# Patient Record
Sex: FEMALE | Race: WHITE | Employment: OTHER | URBAN - METROPOLITAN AREA
[De-identification: names, ages, dates, MRNs, and addresses within clinical notes are randomized per-mention and may not be internally consistent; named-entity substitution may affect disease eponyms.]

---

## 2021-05-27 ENCOUNTER — OFFICE VISIT (OUTPATIENT)
Dept: FAMILY MEDICINE CLINIC | Age: 81
End: 2021-05-27
Payer: COMMERCIAL

## 2021-05-27 VITALS
BODY MASS INDEX: 34.73 KG/M2 | TEMPERATURE: 98.1 F | WEIGHT: 161 LBS | HEIGHT: 57 IN | DIASTOLIC BLOOD PRESSURE: 76 MMHG | SYSTOLIC BLOOD PRESSURE: 110 MMHG | OXYGEN SATURATION: 96 % | HEART RATE: 61 BPM

## 2021-05-27 DIAGNOSIS — Z76.89 ENCOUNTER TO ESTABLISH CARE: Primary | ICD-10-CM

## 2021-05-27 DIAGNOSIS — F41.9 ANXIETY: ICD-10-CM

## 2021-05-27 DIAGNOSIS — E78.5 HYPERLIPIDEMIA, UNSPECIFIED HYPERLIPIDEMIA TYPE: ICD-10-CM

## 2021-05-27 DIAGNOSIS — M81.0 SENILE OSTEOPOROSIS: ICD-10-CM

## 2021-05-27 DIAGNOSIS — F32.A DEPRESSION, UNSPECIFIED DEPRESSION TYPE: ICD-10-CM

## 2021-05-27 DIAGNOSIS — I10 ESSENTIAL HYPERTENSION: ICD-10-CM

## 2021-05-27 DIAGNOSIS — Z00.00 PREVENTATIVE HEALTH CARE: ICD-10-CM

## 2021-05-27 PROBLEM — L89.302 PRESSURE INJURY OF BUTTOCK, STAGE 2 (HCC): Status: ACTIVE | Noted: 2021-05-25

## 2021-05-27 PROCEDURE — 99387 INIT PM E/M NEW PAT 65+ YRS: CPT | Performed by: NURSE PRACTITIONER

## 2021-05-27 RX ORDER — SIMVASTATIN 10 MG
10 TABLET ORAL NIGHTLY
COMMUNITY
End: 2021-06-29 | Stop reason: SDUPTHER

## 2021-05-27 RX ORDER — BUMETANIDE 0.5 MG/1
0.5 TABLET ORAL DAILY
COMMUNITY
End: 2021-06-29 | Stop reason: SDUPTHER

## 2021-05-27 RX ORDER — METOPROLOL SUCCINATE 100 MG/1
100 TABLET, EXTENDED RELEASE ORAL DAILY
COMMUNITY
Start: 2021-03-25 | End: 2021-06-29 | Stop reason: SDUPTHER

## 2021-05-27 RX ORDER — SERTRALINE HYDROCHLORIDE 100 MG/1
50 TABLET, FILM COATED ORAL DAILY
COMMUNITY
Start: 2021-03-25 | End: 2021-06-29 | Stop reason: SDUPTHER

## 2021-05-27 SDOH — ECONOMIC STABILITY: FOOD INSECURITY: WITHIN THE PAST 12 MONTHS, THE FOOD YOU BOUGHT JUST DIDN'T LAST AND YOU DIDN'T HAVE MONEY TO GET MORE.: NEVER TRUE

## 2021-05-27 SDOH — ECONOMIC STABILITY: FOOD INSECURITY: WITHIN THE PAST 12 MONTHS, YOU WORRIED THAT YOUR FOOD WOULD RUN OUT BEFORE YOU GOT MONEY TO BUY MORE.: NEVER TRUE

## 2021-05-27 ASSESSMENT — ENCOUNTER SYMPTOMS
SORE THROAT: 0
BACK PAIN: 0
CHEST TIGHTNESS: 0
CONSTIPATION: 0
DIARRHEA: 0
COUGH: 0
RHINORRHEA: 0
ABDOMINAL PAIN: 0
ABDOMINAL DISTENTION: 0
VOMITING: 0
SHORTNESS OF BREATH: 0
NAUSEA: 0

## 2021-05-27 ASSESSMENT — PATIENT HEALTH QUESTIONNAIRE - PHQ9
SUM OF ALL RESPONSES TO PHQ QUESTIONS 1-9: 0
SUM OF ALL RESPONSES TO PHQ QUESTIONS 1-9: 0

## 2021-05-27 ASSESSMENT — SOCIAL DETERMINANTS OF HEALTH (SDOH): HOW HARD IS IT FOR YOU TO PAY FOR THE VERY BASICS LIKE FOOD, HOUSING, MEDICAL CARE, AND HEATING?: NOT HARD AT ALL

## 2021-05-27 NOTE — PROGRESS NOTES
Nabor Menjivar, APRN-CNP  [address] [facility]  [id number] , [address]  [address] [address]  Dept: [phone]  Dept Fax: [phone]    Patient ID: Dami Lozada is a 80 y.o. female. HPI:  Dami Lozada is a 80 y.o. female who presents to the office today for a first visit and to establish a relationship with a new primary care physician. Today, the patient has no complains. 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 She does live alone as her  has passed away. Michael Azevedo is her legal guardian and his law firm has appointed a home care worker to care for her and speak for her as far as medical care. She is a previous patient of Dr Ulices Enrique. She is currently following with Dr Daya Vance for a stage 2 pressure injury on her medial coccyx. The patient is forgetful and care taker is unsure of previous dates of many screenings. The patient has a history of breast ca and radiation in 2005. Mastectomy was not performed at this time and previous mammogram date unknown. The patient and staff are unsure of last PAP, colonoscopy, or DEXA scan. The patient denies ever being a smoker, only drink for communion, and denies any current or previous drug use. Pt denies any fever or chills. Pt today denies any HA, chest pain, or SOB. Pt denies any N/V/D/C or abdominal pain. Previous office notes, labs and diagnostic studies were reviewed prior to and during encounter. The patient's past medical, surgical, social, and family history as well as her current medications and allergies were reviewed as documented in today's encounter by LUCI De Leon.       Current Outpatient Medications on File Prior to Visit   Medication Sig Dispense Refill    metoprolol succinate (TOPROL XL) 100 MG extended release tablet Take 100 mg by mouth daily      sertraline (ZOLOFT) 100 MG tablet Take 50 mg by mouth daily       POTASSIUM CHLORIDE PO Take 50 mg by mouth Take 1 tablet daily      simvastatin (ZOCOR) 10 MG tablet Take 10 mg by mouth nightly      bumetanide (BUMEX) 0.5 MG tablet Take 0.5 mg by mouth daily      VITAMIN D PO Take 600 mg by mouth 1 tab daily       No current facility-administered medications on file prior to visit. SUBJECTIVE:      Review of Systems   Constitutional: Negative for activity change, fatigue and fever. HENT: Negative for congestion, ear pain, rhinorrhea and sore throat. Eyes: Positive for visual disturbance (wears glasses). Respiratory: Negative for cough, chest tightness and shortness of breath. Cardiovascular: Negative for chest pain and palpitations. Gastrointestinal: Negative for abdominal distention, abdominal pain, constipation, diarrhea, nausea and vomiting. Endocrine: Negative for polydipsia, polyphagia and polyuria. Genitourinary: Negative for difficulty urinating and dysuria. Musculoskeletal: Negative for arthralgias, back pain and myalgias. Skin: Negative for rash. Pressure ulcer to coccyx   Neurological: Negative for dizziness, weakness, light-headedness and headaches. Hematological: Negative for adenopathy. Psychiatric/Behavioral: Positive for confusion (stable) and dysphoric mood (Stable). Negative for agitation and behavioral problems. The patient is nervous/anxious (Stable). OBJECTIVE: /76   Pulse 61   Temp 98.1 °F (36.7 °C)   Ht 4' 9\" (1.448 m)   Wt 161 lb (73 kg)   SpO2 96%   Breastfeeding No   BMI 34.84 kg/m²     Physical Exam  Vitals reviewed. Constitutional:       General: She is not in acute distress. Appearance: Normal appearance. She is well-developed. HENT:      Head: Normocephalic and atraumatic. Right Ear: Hearing and external ear normal.      Left Ear: Hearing and external ear normal.      Nose: Nose normal. No congestion. Right Sinus: No maxillary sinus tenderness or frontal sinus tenderness.       Left Sinus: No maxillary sinus tenderness or frontal sinus tenderness. Mouth/Throat:      Lips: Pink. No lesions. Mouth: Mucous membranes are moist. No oral lesions. Tongue: No lesions. Pharynx: Oropharynx is clear. No oropharyngeal exudate or posterior oropharyngeal erythema. Eyes:      Extraocular Movements: Extraocular movements intact. Conjunctiva/sclera: Conjunctivae normal.      Pupils: Pupils are equal, round, and reactive to light. Comments: Glasses   Neck:      Thyroid: No thyroid mass. Cardiovascular:      Rate and Rhythm: Normal rate and regular rhythm. Pulses: Normal pulses. Heart sounds: Normal heart sounds. No murmur heard. Pulmonary:      Effort: Pulmonary effort is normal. No respiratory distress. Breath sounds: Normal breath sounds and air entry. No wheezing, rhonchi or rales. Abdominal:      General: Bowel sounds are normal. There is no distension. Palpations: Abdomen is soft. Tenderness: There is no abdominal tenderness. Musculoskeletal:         General: Normal range of motion. Cervical back: Full passive range of motion without pain and normal range of motion. Right lower leg: No edema. Left lower leg: No edema. Lymphadenopathy:      Cervical: No cervical adenopathy. Skin:     General: Skin is warm and dry. Capillary Refill: Capillary refill takes less than 2 seconds. Findings: No rash. Neurological:      General: No focal deficit present. Mental Status: She is alert and oriented to person, place, and time. Deep Tendon Reflexes: Reflexes normal.   Psychiatric:         Attention and Perception: Attention and perception normal.         Mood and Affect: Mood and affect normal.         Speech: Speech normal.         Behavior: Behavior normal. Behavior is cooperative. Cognition and Memory: Cognition is impaired. Memory is impaired. ASSESSMENT:   Diagnosis Orders   1.  Encounter to establish care     2. Preventative health care  CBC    Comprehensive Metabolic Panel    Hemoglobin A1C    Lipid Panel    TSH with Reflex   3. Essential hypertension     4. Hyperlipidemia, unspecified hyperlipidemia type     5. Anxiety     6. Depression, unspecified depression type     7. Senile osteoporosis  DEXA BONE DENSITY 2 SITES      PLAN:  1. Preventative health care  - We did discuss in detail the and the recommended preventative screening guidelines (HTN, lipid, DM, breast, cervical cancer, colorectal and osteoporosis). - Detailed education was provided on the patient's after visit summary.  - Will order above noted labs and discuss them at follow up visit. - Annual mammograms as recommended. - CBC; Future  - Comprehensive Metabolic Panel; Future  - Hemoglobin A1C; Future  - Lipid Panel; Future  - TSH with Reflex; Future    2. Essential hypertension  - Stable: Medication re-filled as needed, con't medications as prescribed, con't current tx plan  - Continue Toprolol as previously prescribed. - Continue Bumex as previously prescribed. - Low sodium diet and routine exercise encouraged. - Advised to seek emergent tx if SBP>180 and/or DBP>110, any chest pain, h/a or vision changes. 3. Hyperlipidemia, unspecified hyperlipidemia type  - Stable: Medication re-filled as needed, con't medications as prescribed, con't current tx plan  - Lifestyle modifications discussed and encouraged. - Decrease fats, sugars, carbohydrates, and increase routine exercise (approx. 150 minutes of aerobic activity a week). - Will cont with low fat/chol diet and Simvastatin as ordered. 4. Anxiety  5. Depression, unspecified depression type  - Stable: Medication re-filled as needed, con't medications as prescribed, con't current tx plan  - Continue with Zoloft as previously prescribed. 6. Senile osteoporosis  - Bone density screening every 2 years, beginning at the age of 72, per USPSTF recommendations.    - Screening to begin at the age of 61  if there are additional risk factors for osteoporosis  - Will order and call with results  - DEXA BONE DENSITY 2 SITES; Future    - Medications, labs, diagnostic studies, consultations and follow-up as documented in this encounter.     - On this date May 27, 2021,  I have spent greater than 50% of this visit reviewing previous notes, test results and/or face to face with the patient discussing the diagnoses, importance of compliance with the treatment plan, counseling, coordinating care as well as documenting on the day of the visit.      Diana Roger, APRN-CNP

## 2021-06-28 ENCOUNTER — HOSPITAL ENCOUNTER (OUTPATIENT)
Age: 81
Setting detail: SPECIMEN
Discharge: HOME | End: 2021-06-28
Payer: MEDICARE

## 2021-06-28 DIAGNOSIS — Z00.00 PREVENTATIVE HEALTH CARE: ICD-10-CM

## 2021-06-28 LAB
ALBUMIN SERPL-MCNC: 4.4 G/DL (ref 3.5–5.2)
ALBUMIN/GLOBULIN RATIO: 1.6 (ref 1–2.5)
ALP BLD-CCNC: 90 U/L (ref 35–104)
ALT SERPL-CCNC: 11 U/L (ref 5–33)
ANION GAP SERPL CALCULATED.3IONS-SCNC: 13 MMOL/L (ref 9–17)
AST SERPL-CCNC: 17 U/L
BILIRUB SERPL-MCNC: 0.31 MG/DL (ref 0.3–1.2)
BUN BLDV-MCNC: 20 MG/DL (ref 8–23)
BUN/CREAT BLD: ABNORMAL (ref 9–20)
CALCIUM SERPL-MCNC: 10.2 MG/DL (ref 8.6–10.4)
CHLORIDE BLD-SCNC: 105 MMOL/L (ref 98–107)
CHOLESTEROL/HDL RATIO: 2.6
CHOLESTEROL: 188 MG/DL
CO2: 26 MMOL/L (ref 20–31)
CREAT SERPL-MCNC: 0.68 MG/DL (ref 0.5–0.9)
ESTIMATED AVERAGE GLUCOSE: 108 MG/DL
GFR AFRICAN AMERICAN: >60 ML/MIN
GFR NON-AFRICAN AMERICAN: >60 ML/MIN
GFR SERPL CREATININE-BSD FRML MDRD: ABNORMAL ML/MIN/{1.73_M2}
GFR SERPL CREATININE-BSD FRML MDRD: ABNORMAL ML/MIN/{1.73_M2}
GLUCOSE BLD-MCNC: 135 MG/DL (ref 70–99)
HBA1C MFR BLD: 5.4 % (ref 4–6)
HCT VFR BLD CALC: 41.8 % (ref 36.3–47.1)
HDLC SERPL-MCNC: 71 MG/DL
HEMOGLOBIN: 12.9 G/DL (ref 11.9–15.1)
LDL CHOLESTEROL: 102 MG/DL (ref 0–130)
MCH RBC QN AUTO: 30.7 PG (ref 25.2–33.5)
MCHC RBC AUTO-ENTMCNC: 30.9 G/DL (ref 28.4–34.8)
MCV RBC AUTO: 99.5 FL (ref 82.6–102.9)
NRBC AUTOMATED: 0 PER 100 WBC
PDW BLD-RTO: 13.3 % (ref 11.8–14.4)
PLATELET # BLD: 275 K/UL (ref 138–453)
PMV BLD AUTO: 10.7 FL (ref 8.1–13.5)
POTASSIUM SERPL-SCNC: 4.9 MMOL/L (ref 3.7–5.3)
RBC # BLD: 4.2 M/UL (ref 3.95–5.11)
SODIUM BLD-SCNC: 144 MMOL/L (ref 135–144)
TOTAL PROTEIN: 7.1 G/DL (ref 6.4–8.3)
TRIGL SERPL-MCNC: 75 MG/DL
TSH SERPL DL<=0.05 MIU/L-ACNC: 2.13 MIU/L (ref 0.3–5)
VLDLC SERPL CALC-MCNC: NORMAL MG/DL (ref 1–30)
WBC # BLD: 7.7 K/UL (ref 3.5–11.3)

## 2021-06-29 ENCOUNTER — OFFICE VISIT (OUTPATIENT)
Dept: FAMILY MEDICINE CLINIC | Age: 81
End: 2021-06-29
Payer: MEDICARE

## 2021-06-29 VITALS
WEIGHT: 160 LBS | DIASTOLIC BLOOD PRESSURE: 78 MMHG | OXYGEN SATURATION: 94 % | SYSTOLIC BLOOD PRESSURE: 116 MMHG | BODY MASS INDEX: 34.62 KG/M2 | HEART RATE: 77 BPM | TEMPERATURE: 97.7 F

## 2021-06-29 DIAGNOSIS — F32.A DEPRESSION, UNSPECIFIED DEPRESSION TYPE: ICD-10-CM

## 2021-06-29 DIAGNOSIS — Z98.890 HISTORY OF LUMPECTOMY OF RIGHT BREAST: ICD-10-CM

## 2021-06-29 DIAGNOSIS — I10 ESSENTIAL HYPERTENSION: Primary | ICD-10-CM

## 2021-06-29 DIAGNOSIS — L89.302 PRESSURE INJURY OF BUTTOCK, STAGE 2, UNSPECIFIED LATERALITY (HCC): ICD-10-CM

## 2021-06-29 DIAGNOSIS — Z85.3 HISTORY OF BREAST CANCER: ICD-10-CM

## 2021-06-29 DIAGNOSIS — I87.2 VENOUS INSUFFICIENCY (CHRONIC) (PERIPHERAL): ICD-10-CM

## 2021-06-29 DIAGNOSIS — F41.9 ANXIETY: ICD-10-CM

## 2021-06-29 DIAGNOSIS — E78.5 HYPERLIPIDEMIA, UNSPECIFIED HYPERLIPIDEMIA TYPE: ICD-10-CM

## 2021-06-29 PROCEDURE — 99214 OFFICE O/P EST MOD 30 MIN: CPT | Performed by: NURSE PRACTITIONER

## 2021-06-29 RX ORDER — BUMETANIDE 0.5 MG/1
0.5 TABLET ORAL DAILY
Qty: 90 TABLET | Refills: 1 | Status: SHIPPED | OUTPATIENT
Start: 2021-06-29 | End: 2022-03-14

## 2021-06-29 RX ORDER — SIMVASTATIN 10 MG
10 TABLET ORAL NIGHTLY
Qty: 90 TABLET | Refills: 1 | Status: SHIPPED | OUTPATIENT
Start: 2021-06-29 | End: 2021-12-14

## 2021-06-29 RX ORDER — METOPROLOL SUCCINATE 100 MG/1
100 TABLET, EXTENDED RELEASE ORAL DAILY
Qty: 90 TABLET | Refills: 1 | Status: SHIPPED | OUTPATIENT
Start: 2021-06-29 | End: 2021-12-14

## 2021-06-29 RX ORDER — SERTRALINE HYDROCHLORIDE 100 MG/1
50 TABLET, FILM COATED ORAL DAILY
Qty: 90 TABLET | Refills: 1 | Status: SHIPPED | OUTPATIENT
Start: 2021-06-29 | End: 2021-12-14

## 2021-06-29 ASSESSMENT — ENCOUNTER SYMPTOMS
VOMITING: 0
DIARRHEA: 0
ABDOMINAL PAIN: 0
BACK PAIN: 0
ABDOMINAL DISTENTION: 0
SHORTNESS OF BREATH: 0
CONSTIPATION: 0
RHINORRHEA: 0
CHEST TIGHTNESS: 0
NAUSEA: 0
SORE THROAT: 0
COUGH: 0

## 2021-06-29 NOTE — PROGRESS NOTES
wound (Pressure ulcer to coccyx; following with Dr. Kalani Adhikari). Negative for rash. Neurological: Negative for dizziness, weakness, light-headedness and headaches. Hematological: Negative for adenopathy. Psychiatric/Behavioral: Positive for confusion (stable) and dysphoric mood (stable). Negative for agitation and behavioral problems. The patient is nervous/anxious (stable). OBJECTIVE:  /78   Pulse 77   Temp 97.7 °F (36.5 °C)   Wt 160 lb (72.6 kg)   SpO2 94%   Breastfeeding No   BMI 34.62 kg/m²     Physical Exam  Vitals reviewed. Constitutional:       General: She is not in acute distress. Appearance: Normal appearance. She is well-developed. HENT:      Head: Normocephalic and atraumatic. Right Ear: Hearing and external ear normal.      Left Ear: Hearing and external ear normal.      Nose: Nose normal. No congestion. Right Sinus: No maxillary sinus tenderness or frontal sinus tenderness. Left Sinus: No maxillary sinus tenderness or frontal sinus tenderness. Mouth/Throat:      Lips: Pink. No lesions. Mouth: Mucous membranes are moist. No oral lesions. Tongue: No lesions. Pharynx: Oropharynx is clear. No oropharyngeal exudate or posterior oropharyngeal erythema. Eyes:      Extraocular Movements: Extraocular movements intact. Conjunctiva/sclera: Conjunctivae normal.      Pupils: Pupils are equal, round, and reactive to light. Neck:      Thyroid: No thyroid mass. Cardiovascular:      Rate and Rhythm: Normal rate and regular rhythm. Pulses: Normal pulses. Heart sounds: Normal heart sounds. No murmur heard. Pulmonary:      Effort: Pulmonary effort is normal. No respiratory distress. Breath sounds: Normal breath sounds and air entry. No wheezing, rhonchi or rales. Abdominal:      General: Bowel sounds are normal. There is no distension. Palpations: Abdomen is soft. Tenderness: There is no abdominal tenderness. Medication re-filled as needed, con't medications as prescribed, con't current tx plan  - Continue with Zoloft as previously prescribed. - Offered reassurance, allowed to ventilate feelings and ask questions; non-pharmological coping methods discussed. 5. Venous insufficiency (chronic) (peripheral)  - Stable: Medication re-filled as needed, con't medications as prescribed, con't current tx plan     6. Pressure injury of buttock, stage 2, unspecified laterality ([facility] [address]  - Resolved  - Was following with Dr. Alejandro Hernadez, but is no longer. 7. History of breast cancer  8. History of lumpectomy of right breast  - Due to patient's mentation, difficulty finding out diagnosis  - No documentation in Care Everywhere  - Possibly [address] [place] [address]-  - Will follow    - Rest of systems unchanged, continue current treatments. - On this date June 29, 2021,  I have spent greater than 50% of this visit reviewing previous notes, test results and/or face to face with the patient discussing the diagnoses, importance of compliance with the treatment plan, counseling, coordinating care as well as documenting on the day of the visit.      Abi Arthur, APRN-CNP

## 2021-07-15 ENCOUNTER — OFFICE VISIT (OUTPATIENT)
Dept: FAMILY MEDICINE CLINIC | Age: 81
End: 2021-07-15
Payer: MEDICARE

## 2021-07-15 VITALS
HEART RATE: 70 BPM | OXYGEN SATURATION: 96 % | SYSTOLIC BLOOD PRESSURE: 110 MMHG | DIASTOLIC BLOOD PRESSURE: 70 MMHG | WEIGHT: 160 LBS | TEMPERATURE: 97.1 F | BODY MASS INDEX: 34.62 KG/M2

## 2021-07-15 DIAGNOSIS — F02.80 ALZHEIMER'S DEMENTIA WITHOUT BEHAVIORAL DISTURBANCE, UNSPECIFIED TIMING OF DEMENTIA ONSET: Primary | ICD-10-CM

## 2021-07-15 DIAGNOSIS — G30.9 ALZHEIMER'S DEMENTIA WITHOUT BEHAVIORAL DISTURBANCE, UNSPECIFIED TIMING OF DEMENTIA ONSET: Primary | ICD-10-CM

## 2021-07-15 PROCEDURE — 99213 OFFICE O/P EST LOW 20 MIN: CPT | Performed by: NURSE PRACTITIONER

## 2021-07-15 RX ORDER — DONEPEZIL HYDROCHLORIDE 5 MG/1
5 TABLET, FILM COATED ORAL NIGHTLY
Qty: 90 TABLET | Refills: 0 | Status: SHIPPED | OUTPATIENT
Start: 2021-07-15 | End: 2021-11-15

## 2021-11-15 DIAGNOSIS — G30.9 ALZHEIMER'S DEMENTIA WITHOUT BEHAVIORAL DISTURBANCE, UNSPECIFIED TIMING OF DEMENTIA ONSET: ICD-10-CM

## 2021-11-15 DIAGNOSIS — F02.80 ALZHEIMER'S DEMENTIA WITHOUT BEHAVIORAL DISTURBANCE, UNSPECIFIED TIMING OF DEMENTIA ONSET: ICD-10-CM

## 2021-11-15 RX ORDER — DONEPEZIL HYDROCHLORIDE 5 MG/1
TABLET, FILM COATED ORAL
Qty: 90 TABLET | Refills: 3 | Status: SHIPPED | OUTPATIENT
Start: 2021-11-15 | End: 2021-11-30

## 2021-11-30 DIAGNOSIS — F02.80 ALZHEIMER'S DEMENTIA WITHOUT BEHAVIORAL DISTURBANCE, UNSPECIFIED TIMING OF DEMENTIA ONSET: ICD-10-CM

## 2021-11-30 DIAGNOSIS — G30.9 ALZHEIMER'S DEMENTIA WITHOUT BEHAVIORAL DISTURBANCE, UNSPECIFIED TIMING OF DEMENTIA ONSET: ICD-10-CM

## 2021-11-30 RX ORDER — DONEPEZIL HYDROCHLORIDE 10 MG/1
10 TABLET, FILM COATED ORAL NIGHTLY
Qty: 90 TABLET | Refills: 1 | Status: SHIPPED | OUTPATIENT
Start: 2021-11-30 | End: 2021-12-14 | Stop reason: ALTCHOICE

## 2021-12-14 ENCOUNTER — HOSPITAL ENCOUNTER (INPATIENT)
Age: 81
LOS: 2 days | Discharge: HOME | DRG: 177 | End: 2021-12-16
Attending: EMERGENCY MEDICINE | Admitting: INTERNAL MEDICINE
Payer: MEDICARE

## 2021-12-14 ENCOUNTER — APPOINTMENT (OUTPATIENT)
Dept: CT IMAGING | Age: 81
DRG: 177 | End: 2021-12-14
Payer: MEDICARE

## 2021-12-14 ENCOUNTER — APPOINTMENT (OUTPATIENT)
Dept: GENERAL RADIOLOGY | Age: 81
DRG: 177 | End: 2021-12-14
Payer: MEDICARE

## 2021-12-14 DIAGNOSIS — J12.82 PNEUMONIA DUE TO COVID-19 VIRUS: ICD-10-CM

## 2021-12-14 DIAGNOSIS — U07.1 2019 NOVEL CORONAVIRUS-INFECTED PNEUMONIA (NCIP): Primary | ICD-10-CM

## 2021-12-14 DIAGNOSIS — U07.1 PNEUMONIA DUE TO COVID-19 VIRUS: ICD-10-CM

## 2021-12-14 DIAGNOSIS — J12.82 2019 NOVEL CORONAVIRUS-INFECTED PNEUMONIA (NCIP): Primary | ICD-10-CM

## 2021-12-14 LAB
-: NORMAL
ABSOLUTE BANDS #: 1.49 K/UL (ref 0–1)
ABSOLUTE EOS #: 0 K/UL (ref 0–0.4)
ABSOLUTE IMMATURE GRANULOCYTE: ABNORMAL K/UL (ref 0–0.3)
ABSOLUTE LYMPH #: 0.28 K/UL (ref 1–4.8)
ABSOLUTE MONO #: 0.56 K/UL (ref 0.1–1.3)
ALBUMIN SERPL-MCNC: 4.3 G/DL (ref 3.5–5.2)
ALBUMIN/GLOBULIN RATIO: ABNORMAL (ref 1–2.5)
ALLEN TEST: NORMAL
ALP BLD-CCNC: 86 U/L (ref 35–104)
ALT SERPL-CCNC: 9 U/L (ref 5–33)
ANION GAP SERPL CALCULATED.3IONS-SCNC: 14 MMOL/L (ref 9–17)
AST SERPL-CCNC: 15 U/L
BANDS: 16 % (ref 0–10)
BASOPHILS # BLD: 0 % (ref 0–2)
BASOPHILS ABSOLUTE: 0 K/UL (ref 0–0.2)
BILIRUB SERPL-MCNC: 0.32 MG/DL (ref 0.3–1.2)
BUN BLDV-MCNC: 16 MG/DL (ref 8–23)
BUN/CREAT BLD: ABNORMAL (ref 9–20)
CALCIUM SERPL-MCNC: 9.6 MG/DL (ref 8.6–10.4)
CARBOXYHEMOGLOBIN: 2.3 % (ref 0–5)
CHLORIDE BLD-SCNC: 99 MMOL/L (ref 98–107)
CO2: 24 MMOL/L (ref 20–31)
CREAT SERPL-MCNC: 0.86 MG/DL (ref 0.5–0.9)
DIFFERENTIAL TYPE: ABNORMAL
EOSINOPHILS RELATIVE PERCENT: 0 % (ref 0–4)
FIO2: NORMAL
GFR AFRICAN AMERICAN: >60 ML/MIN
GFR NON-AFRICAN AMERICAN: >60 ML/MIN
GFR SERPL CREATININE-BSD FRML MDRD: ABNORMAL ML/MIN/{1.73_M2}
GFR SERPL CREATININE-BSD FRML MDRD: ABNORMAL ML/MIN/{1.73_M2}
GLUCOSE BLD-MCNC: 165 MG/DL (ref 70–99)
HCO3 VENOUS: 25.8 MMOL/L (ref 24–30)
HCT VFR BLD CALC: 35.6 % (ref 36–46)
HEMOGLOBIN: 12 G/DL (ref 12–16)
IMMATURE GRANULOCYTES: ABNORMAL %
INFLUENZA A: NOT DETECTED
INFLUENZA B: NOT DETECTED
LACTIC ACID: 1.7 MMOL/L (ref 0.5–2.2)
LIPASE: 20 U/L (ref 13–60)
LYMPHOCYTES # BLD: 3 % (ref 24–44)
MAGNESIUM: 2.1 MG/DL (ref 1.6–2.6)
MCH RBC QN AUTO: 31 PG (ref 26–34)
MCHC RBC AUTO-ENTMCNC: 33.6 G/DL (ref 31–37)
MCV RBC AUTO: 92.4 FL (ref 80–100)
METHEMOGLOBIN: 0.4 % (ref 0–1.9)
MODE: NORMAL
MONOCYTES # BLD: 6 % (ref 1–7)
MORPHOLOGY: NORMAL
NEGATIVE BASE EXCESS, VEN: NORMAL MMOL/L (ref 0–2)
NOTIFICATION TIME: NORMAL
NOTIFICATION: NORMAL
NRBC AUTOMATED: ABNORMAL PER 100 WBC
O2 DEVICE/FLOW/%: NORMAL
O2 SAT, VEN: 80.4 % (ref 60–85)
OXYHEMOGLOBIN: NORMAL % (ref 95–98)
PATIENT TEMP: 37
PCO2, VEN, TEMP ADJ: NORMAL MMHG (ref 39–55)
PCO2, VEN: 46 (ref 39–55)
PDW BLD-RTO: 14.1 % (ref 11.5–14.9)
PEEP/CPAP: NORMAL
PH VENOUS: 7.36 (ref 7.32–7.42)
PH, VEN, TEMP ADJ: NORMAL (ref 7.32–7.42)
PLATELET # BLD: 157 K/UL (ref 150–450)
PLATELET ESTIMATE: ABNORMAL
PMV BLD AUTO: 9 FL (ref 6–12)
PO2, VEN, TEMP ADJ: NORMAL MMHG (ref 30–50)
PO2, VEN: 46.2 (ref 30–50)
POSITIVE BASE EXCESS, VEN: 0.3 MMOL/L (ref 0–2)
POTASSIUM SERPL-SCNC: 3.7 MMOL/L (ref 3.7–5.3)
PSV: NORMAL
PT. POSITION: NORMAL
RBC # BLD: 3.85 M/UL (ref 4–5.2)
RBC # BLD: ABNORMAL 10*6/UL
REASON FOR REJECTION: NORMAL
RESPIRATORY RATE: NORMAL
SAMPLE SITE: NORMAL
SARS-COV-2 RNA, RT PCR: DETECTED
SEG NEUTROPHILS: 75 % (ref 36–66)
SEGMENTED NEUTROPHILS ABSOLUTE COUNT: 6.97 K/UL (ref 1.3–9.1)
SET RATE: NORMAL
SODIUM BLD-SCNC: 137 MMOL/L (ref 135–144)
SOURCE: ABNORMAL
SPECIMEN DESCRIPTION: ABNORMAL
TEXT FOR RESPIRATORY: NORMAL
TOTAL HB: NORMAL G/DL (ref 12–16)
TOTAL PROTEIN: 6.7 G/DL (ref 6.4–8.3)
TOTAL RATE: NORMAL
TROPONIN INTERP: NORMAL
TROPONIN T: NORMAL NG/ML
TROPONIN, HIGH SENSITIVITY: 14 NG/L (ref 0–14)
VT: NORMAL
WBC # BLD: 9.3 K/UL (ref 3.5–11)
WBC # BLD: ABNORMAL 10*3/UL
ZZ NTE CLEAN UP: ORDERED TEST: NORMAL
ZZ NTE WITH NAME CLEAN UP: SPECIMEN SOURCE: NORMAL

## 2021-12-14 PROCEDURE — 83605 ASSAY OF LACTIC ACID: CPT

## 2021-12-14 PROCEDURE — 83735 ASSAY OF MAGNESIUM: CPT

## 2021-12-14 PROCEDURE — 70450 CT HEAD/BRAIN W/O DYE: CPT

## 2021-12-14 PROCEDURE — 80053 COMPREHEN METABOLIC PANEL: CPT

## 2021-12-14 PROCEDURE — 85025 COMPLETE CBC W/AUTO DIFF WBC: CPT

## 2021-12-14 PROCEDURE — 82800 BLOOD PH: CPT

## 2021-12-14 PROCEDURE — 82805 BLOOD GASES W/O2 SATURATION: CPT

## 2021-12-14 PROCEDURE — 6360000002 HC RX W HCPCS: Performed by: EMERGENCY MEDICINE

## 2021-12-14 PROCEDURE — 2060000000 HC ICU INTERMEDIATE R&B

## 2021-12-14 PROCEDURE — 36415 COLL VENOUS BLD VENIPUNCTURE: CPT

## 2021-12-14 PROCEDURE — 87636 SARSCOV2 & INF A&B AMP PRB: CPT

## 2021-12-14 PROCEDURE — 71045 X-RAY EXAM CHEST 1 VIEW: CPT

## 2021-12-14 PROCEDURE — 99285 EMERGENCY DEPT VISIT HI MDM: CPT

## 2021-12-14 PROCEDURE — 84484 ASSAY OF TROPONIN QUANT: CPT

## 2021-12-14 PROCEDURE — 93005 ELECTROCARDIOGRAM TRACING: CPT | Performed by: EMERGENCY MEDICINE

## 2021-12-14 PROCEDURE — 99223 1ST HOSP IP/OBS HIGH 75: CPT | Performed by: INTERNAL MEDICINE

## 2021-12-14 PROCEDURE — 83690 ASSAY OF LIPASE: CPT

## 2021-12-14 RX ORDER — DEXAMETHASONE 4 MG/1
6 TABLET ORAL ONCE
Status: COMPLETED | OUTPATIENT
Start: 2021-12-14 | End: 2021-12-14

## 2021-12-14 RX ORDER — MULTIVIT-MIN/IRON/FOLIC ACID/K 18-600-40
1 CAPSULE ORAL DAILY
COMMUNITY

## 2021-12-14 RX ORDER — SERTRALINE HYDROCHLORIDE 25 MG/1
25 TABLET, FILM COATED ORAL DAILY
COMMUNITY
End: 2021-12-14

## 2021-12-14 RX ORDER — AMLODIPINE BESYLATE 5 MG/1
5 TABLET ORAL DAILY
COMMUNITY
End: 2022-05-06 | Stop reason: ALTCHOICE

## 2021-12-14 RX ORDER — DEXAMETHASONE 4 MG/1
6 TABLET ORAL DAILY
Status: DISCONTINUED | OUTPATIENT
Start: 2021-12-15 | End: 2021-12-16 | Stop reason: HOSPADM

## 2021-12-14 RX ORDER — METOPROLOL SUCCINATE 50 MG/1
25 TABLET, EXTENDED RELEASE ORAL DAILY
Status: ON HOLD | COMMUNITY
End: 2021-12-16 | Stop reason: HOSPADM

## 2021-12-14 RX ADMIN — DEXAMETHASONE 6 MG: 4 TABLET ORAL at 23:02

## 2021-12-15 ENCOUNTER — APPOINTMENT (OUTPATIENT)
Dept: CT IMAGING | Age: 81
DRG: 177 | End: 2021-12-15
Payer: MEDICARE

## 2021-12-15 LAB
ANION GAP SERPL CALCULATED.3IONS-SCNC: 10 MMOL/L (ref 9–17)
BUN BLDV-MCNC: 16 MG/DL (ref 8–23)
BUN/CREAT BLD: ABNORMAL (ref 9–20)
C-REACTIVE PROTEIN: 6.9 MG/L (ref 0–5)
CALCIUM SERPL-MCNC: 10 MG/DL (ref 8.6–10.4)
CHLORIDE BLD-SCNC: 101 MMOL/L (ref 98–107)
CO2: 27 MMOL/L (ref 20–31)
CREAT SERPL-MCNC: 0.77 MG/DL (ref 0.5–0.9)
D-DIMER QUANTITATIVE: 9.64 MG/L FEU (ref 0–0.59)
EKG ATRIAL RATE: 51 BPM
EKG P AXIS: 38 DEGREES
EKG P-R INTERVAL: 196 MS
EKG Q-T INTERVAL: 484 MS
EKG QRS DURATION: 78 MS
EKG QTC CALCULATION (BAZETT): 446 MS
EKG R AXIS: 44 DEGREES
EKG T AXIS: 63 DEGREES
EKG VENTRICULAR RATE: 51 BPM
FERRITIN: 122 UG/L (ref 13–150)
GFR AFRICAN AMERICAN: >60 ML/MIN
GFR NON-AFRICAN AMERICAN: >60 ML/MIN
GFR SERPL CREATININE-BSD FRML MDRD: ABNORMAL ML/MIN/{1.73_M2}
GFR SERPL CREATININE-BSD FRML MDRD: ABNORMAL ML/MIN/{1.73_M2}
GLUCOSE BLD-MCNC: 153 MG/DL (ref 70–99)
HCT VFR BLD CALC: 35.8 % (ref 36–46)
HEMOGLOBIN: 11.9 G/DL (ref 12–16)
LACTATE DEHYDROGENASE: 153 U/L (ref 135–214)
MCH RBC QN AUTO: 31 PG (ref 26–34)
MCHC RBC AUTO-ENTMCNC: 33.4 G/DL (ref 31–37)
MCV RBC AUTO: 92.8 FL (ref 80–100)
NRBC AUTOMATED: ABNORMAL PER 100 WBC
PDW BLD-RTO: 14.5 % (ref 11.5–14.9)
PLATELET # BLD: 167 K/UL (ref 150–450)
PMV BLD AUTO: 9.5 FL (ref 6–12)
POTASSIUM SERPL-SCNC: 4.4 MMOL/L (ref 3.7–5.3)
PROCALCITONIN: 0.14 NG/ML
RBC # BLD: 3.85 M/UL (ref 4–5.2)
SODIUM BLD-SCNC: 138 MMOL/L (ref 135–144)
TROPONIN INTERP: ABNORMAL
TROPONIN T: ABNORMAL NG/ML
TROPONIN, HIGH SENSITIVITY: 16 NG/L (ref 0–14)
TSH SERPL DL<=0.05 MIU/L-ACNC: 0.85 MIU/L (ref 0.3–5)
WBC # BLD: 6.9 K/UL (ref 3.5–11)

## 2021-12-15 PROCEDURE — 80048 BASIC METABOLIC PNL TOTAL CA: CPT

## 2021-12-15 PROCEDURE — 86140 C-REACTIVE PROTEIN: CPT

## 2021-12-15 PROCEDURE — 6360000004 HC RX CONTRAST MEDICATION: Performed by: INTERNAL MEDICINE

## 2021-12-15 PROCEDURE — 2580000003 HC RX 258: Performed by: INTERNAL MEDICINE

## 2021-12-15 PROCEDURE — 82728 ASSAY OF FERRITIN: CPT

## 2021-12-15 PROCEDURE — 2580000003 HC RX 258: Performed by: NURSE PRACTITIONER

## 2021-12-15 PROCEDURE — 84145 PROCALCITONIN (PCT): CPT

## 2021-12-15 PROCEDURE — 83615 LACTATE (LD) (LDH) ENZYME: CPT

## 2021-12-15 PROCEDURE — 93010 ELECTROCARDIOGRAM REPORT: CPT | Performed by: INTERNAL MEDICINE

## 2021-12-15 PROCEDURE — 85027 COMPLETE CBC AUTOMATED: CPT

## 2021-12-15 PROCEDURE — 2060000000 HC ICU INTERMEDIATE R&B

## 2021-12-15 PROCEDURE — 84443 ASSAY THYROID STIM HORMONE: CPT

## 2021-12-15 PROCEDURE — 85379 FIBRIN DEGRADATION QUANT: CPT

## 2021-12-15 PROCEDURE — 6370000000 HC RX 637 (ALT 250 FOR IP): Performed by: NURSE PRACTITIONER

## 2021-12-15 PROCEDURE — 6360000002 HC RX W HCPCS: Performed by: NURSE PRACTITIONER

## 2021-12-15 PROCEDURE — 6370000000 HC RX 637 (ALT 250 FOR IP): Performed by: INTERNAL MEDICINE

## 2021-12-15 PROCEDURE — 71260 CT THORAX DX C+: CPT

## 2021-12-15 PROCEDURE — 36415 COLL VENOUS BLD VENIPUNCTURE: CPT

## 2021-12-15 RX ORDER — 0.9 % SODIUM CHLORIDE 0.9 %
80 INTRAVENOUS SOLUTION INTRAVENOUS ONCE
Status: COMPLETED | OUTPATIENT
Start: 2021-12-15 | End: 2021-12-15

## 2021-12-15 RX ORDER — BUMETANIDE 1 MG/1
0.5 TABLET ORAL DAILY
Status: DISCONTINUED | OUTPATIENT
Start: 2021-12-15 | End: 2021-12-16 | Stop reason: HOSPADM

## 2021-12-15 RX ORDER — SODIUM CHLORIDE 9 MG/ML
25 INJECTION, SOLUTION INTRAVENOUS PRN
Status: DISCONTINUED | OUTPATIENT
Start: 2021-12-15 | End: 2021-12-16 | Stop reason: HOSPADM

## 2021-12-15 RX ORDER — VITAMIN B COMPLEX
2000 TABLET ORAL DAILY
Status: DISCONTINUED | OUTPATIENT
Start: 2021-12-15 | End: 2021-12-16 | Stop reason: HOSPADM

## 2021-12-15 RX ORDER — ACETAMINOPHEN 650 MG/1
650 SUPPOSITORY RECTAL EVERY 6 HOURS PRN
Status: DISCONTINUED | OUTPATIENT
Start: 2021-12-15 | End: 2021-12-16 | Stop reason: HOSPADM

## 2021-12-15 RX ORDER — ONDANSETRON 4 MG/1
4 TABLET, ORALLY DISINTEGRATING ORAL EVERY 8 HOURS PRN
Status: DISCONTINUED | OUTPATIENT
Start: 2021-12-15 | End: 2021-12-16 | Stop reason: HOSPADM

## 2021-12-15 RX ORDER — AMLODIPINE BESYLATE 5 MG/1
5 TABLET ORAL DAILY
Status: DISCONTINUED | OUTPATIENT
Start: 2021-12-15 | End: 2021-12-16 | Stop reason: HOSPADM

## 2021-12-15 RX ORDER — POTASSIUM CHLORIDE 750 MG/1
10 CAPSULE, EXTENDED RELEASE ORAL DAILY
Status: DISCONTINUED | OUTPATIENT
Start: 2021-12-15 | End: 2021-12-16 | Stop reason: HOSPADM

## 2021-12-15 RX ORDER — ONDANSETRON 2 MG/ML
4 INJECTION INTRAMUSCULAR; INTRAVENOUS EVERY 6 HOURS PRN
Status: DISCONTINUED | OUTPATIENT
Start: 2021-12-15 | End: 2021-12-16 | Stop reason: HOSPADM

## 2021-12-15 RX ORDER — SODIUM CHLORIDE 0.9 % (FLUSH) 0.9 %
10 SYRINGE (ML) INJECTION PRN
Status: DISCONTINUED | OUTPATIENT
Start: 2021-12-15 | End: 2021-12-16 | Stop reason: HOSPADM

## 2021-12-15 RX ORDER — SODIUM CHLORIDE 0.9 % (FLUSH) 0.9 %
5-40 SYRINGE (ML) INJECTION EVERY 12 HOURS SCHEDULED
Status: DISCONTINUED | OUTPATIENT
Start: 2021-12-15 | End: 2021-12-16 | Stop reason: HOSPADM

## 2021-12-15 RX ORDER — ACETAMINOPHEN 325 MG/1
650 TABLET ORAL EVERY 6 HOURS PRN
Status: DISCONTINUED | OUTPATIENT
Start: 2021-12-15 | End: 2021-12-16 | Stop reason: HOSPADM

## 2021-12-15 RX ORDER — METOPROLOL SUCCINATE 25 MG/1
25 TABLET, EXTENDED RELEASE ORAL DAILY
Status: DISCONTINUED | OUTPATIENT
Start: 2021-12-15 | End: 2021-12-16

## 2021-12-15 RX ADMIN — DEXAMETHASONE 6 MG: 4 TABLET ORAL at 08:59

## 2021-12-15 RX ADMIN — AMLODIPINE BESYLATE 5 MG: 5 TABLET ORAL at 08:59

## 2021-12-15 RX ADMIN — SODIUM CHLORIDE 80 ML: 9 INJECTION, SOLUTION INTRAVENOUS at 13:09

## 2021-12-15 RX ADMIN — SODIUM CHLORIDE, PRESERVATIVE FREE 10 ML: 5 INJECTION INTRAVENOUS at 13:09

## 2021-12-15 RX ADMIN — APIXABAN 5 MG: 5 TABLET, FILM COATED ORAL at 21:50

## 2021-12-15 RX ADMIN — BUMETANIDE 0.5 MG: 1 TABLET ORAL at 08:58

## 2021-12-15 RX ADMIN — ENOXAPARIN SODIUM 40 MG: 100 INJECTION SUBCUTANEOUS at 08:59

## 2021-12-15 RX ADMIN — SODIUM CHLORIDE, PRESERVATIVE FREE 10 ML: 5 INJECTION INTRAVENOUS at 08:59

## 2021-12-15 RX ADMIN — Medication 2000 UNITS: at 08:58

## 2021-12-15 RX ADMIN — IOPAMIDOL 75 ML: 755 INJECTION, SOLUTION INTRAVENOUS at 13:08

## 2021-12-15 RX ADMIN — SODIUM CHLORIDE, PRESERVATIVE FREE 10 ML: 5 INJECTION INTRAVENOUS at 21:55

## 2021-12-15 RX ADMIN — POTASSIUM CHLORIDE 10 MEQ: 10 CAPSULE, COATED, EXTENDED RELEASE ORAL at 08:59

## 2021-12-15 ASSESSMENT — PAIN SCALES - PAIN ASSESSMENT IN ADVANCED DEMENTIA (PAINAD)
BODYLANGUAGE: 1
TOTALSCORE: 2
BODYLANGUAGE: 1
BODYLANGUAGE: 0
NEGVOCALIZATION: 0
FACIALEXPRESSION: 0
BREATHING: 0
TOTALSCORE: 0
BODYLANGUAGE: 0
TOTALSCORE: 0
TOTALSCORE: 0
NEGVOCALIZATION: 0
BREATHING: 0
CONSOLABILITY: 0
TOTALSCORE: 2
TOTALSCORE: 0
BODYLANGUAGE: 1
TOTALSCORE: 2
BODYLANGUAGE: 1
BREATHING: 0
BREATHING: 0
FACIALEXPRESSION: 0
CONSOLABILITY: 0
BREATHING: 0
NEGVOCALIZATION: 1
BREATHING: 0
BODYLANGUAGE: 0
FACIALEXPRESSION: 0
FACIALEXPRESSION: 0
CONSOLABILITY: 0
TOTALSCORE: 2
CONSOLABILITY: 0
BODYLANGUAGE: 0
BREATHING: 0
FACIALEXPRESSION: 0
BODYLANGUAGE: 0
TOTALSCORE: 0
NEGVOCALIZATION: 0
FACIALEXPRESSION: 0
NEGVOCALIZATION: 0
BODYLANGUAGE: 0
NEGVOCALIZATION: 0
FACIALEXPRESSION: 0
CONSOLABILITY: 0
FACIALEXPRESSION: 0
CONSOLABILITY: 0
TOTALSCORE: 0
NEGVOCALIZATION: 0
NEGVOCALIZATION: 1
CONSOLABILITY: 0
CONSOLABILITY: 0
FACIALEXPRESSION: 0
TOTALSCORE: 2
BREATHING: 0
CONSOLABILITY: 0
TOTALSCORE: 0
NEGVOCALIZATION: 0
BREATHING: 0
BODYLANGUAGE: 0
TOTALSCORE: 0
NEGVOCALIZATION: 0
FACIALEXPRESSION: 0
BODYLANGUAGE: 0
TOTALSCORE: 0
NEGVOCALIZATION: 0
BREATHING: 0
FACIALEXPRESSION: 0
BREATHING: 0
CONSOLABILITY: 0
TOTALSCORE: 0
NEGVOCALIZATION: 0
NEGVOCALIZATION: 0
CONSOLABILITY: 0
FACIALEXPRESSION: 0
BREATHING: 0
CONSOLABILITY: 0
CONSOLABILITY: 0
BODYLANGUAGE: 0
BREATHING: 0
FACIALEXPRESSION: 0
FACIALEXPRESSION: 0
TOTALSCORE: 0
TOTALSCORE: 2
CONSOLABILITY: 0
BREATHING: 0
BODYLANGUAGE: 0
BREATHING: 0
BREATHING: 0
NEGVOCALIZATION: 1
BREATHING: 0
FACIALEXPRESSION: 0
FACIALEXPRESSION: 0
BREATHING: 0
NEGVOCALIZATION: 1
FACIALEXPRESSION: 0
NEGVOCALIZATION: 1
NEGVOCALIZATION: 0
BODYLANGUAGE: 1
NEGVOCALIZATION: 1
BODYLANGUAGE: 0
CONSOLABILITY: 0
FACIALEXPRESSION: 0
CONSOLABILITY: 0
BODYLANGUAGE: 1
TOTALSCORE: 0
BODYLANGUAGE: 0

## 2021-12-15 ASSESSMENT — PAIN SCALES - GENERAL
PAINLEVEL_OUTOF10: 0
PAINLEVEL_OUTOF10: 2
PAINLEVEL_OUTOF10: 0

## 2021-12-15 ASSESSMENT — ENCOUNTER SYMPTOMS
VOMITING: 1
CONSTIPATION: 0
ABDOMINAL PAIN: 0
SHORTNESS OF BREATH: 0
COUGH: 0
BACK PAIN: 0
ABDOMINAL PAIN: 1
DIARRHEA: 0
WHEEZING: 0
SORE THROAT: 0
NAUSEA: 1

## 2021-12-15 ASSESSMENT — PAIN - FUNCTIONAL ASSESSMENT: PAIN_FUNCTIONAL_ASSESSMENT: ACTIVITIES ARE NOT PREVENTED

## 2021-12-15 ASSESSMENT — PAIN DESCRIPTION - ONSET: ONSET: ON-GOING

## 2021-12-15 ASSESSMENT — PAIN DESCRIPTION - DESCRIPTORS: DESCRIPTORS: ACHING

## 2021-12-15 ASSESSMENT — PAIN DESCRIPTION - PROGRESSION
CLINICAL_PROGRESSION: NOT CHANGED

## 2021-12-15 ASSESSMENT — PAIN DESCRIPTION - ORIENTATION: ORIENTATION: LEFT;RIGHT

## 2021-12-15 ASSESSMENT — PAIN DESCRIPTION - PAIN TYPE: TYPE: CHRONIC PAIN;ACUTE PAIN

## 2021-12-15 ASSESSMENT — PAIN DESCRIPTION - FREQUENCY: FREQUENCY: INTERMITTENT

## 2021-12-15 ASSESSMENT — PAIN DESCRIPTION - LOCATION: LOCATION: LEG

## 2021-12-15 NOTE — PROGRESS NOTES
Patient admitted to ICU 2011 via stretcher. Patient currently on 5L NC with oxygen saturation at 100%. Patient oxygen decreased to 2 LNC. Writer at bedside. Bed to lowest position, call light within reach. Patient does want EPC cuff on at this time.

## 2021-12-15 NOTE — PROGRESS NOTES
Nicole Wolff called to unit, requesting RN. Writer spoke with Nicole Wolff, who expresses her dissatisfaction with restraints and requests them to be taken off. Informed Nicole Wolff that the pt's safety is my highest priority while offering empathetic communication. Nicole Wolff requesting to sit at pt's bedside as that would likely be comforting for pt. Educated Nicole Wolff on unit policy of no visitors while pt is in droplet isolation. Informed her I would discuss possibility of a 1:1 sitter with clin lead.

## 2021-12-15 NOTE — ED NOTES
Report given to Fely Borges RN from ICU. Report method by phone   The following was reviewed with receiving RN:   Current vital signs:  BP (!) 82/39   Pulse (!) 48   Temp 98.2 °F (36.8 °C)   Resp 21   Wt 175 lb (79.4 kg)   SpO2 95%   BMI 37.87 kg/m²                MEWS Score: 2     Any medication or safety alerts were reviewed. Any pending diagnostics and notifications were also reviewed, as well as any safety concerns or issues, abnormal labs, abnormal imaging, and abnormal assessment findings. Questions were answered.             Yasmin Lerma RN  12/15/21 [id number]

## 2021-12-15 NOTE — CONSULTS
[facility]   CONSULT NOTE:      DATE OF CONSULT 12/15/2021    REASON FOR CONSULTATION:  Hypoxia Covid infection      PCP CHARISSE Pichardo NP     CHIEF COMPLAINT: Weakness and fatigue    HISTORY OF PRESENT ILLNESS:   Carlota is a 29-year-old female with multiple comorbidities including hypertension anxiety, depression, history of breast cancer who presents with overwhelming fatigue and weakness. Yesterday evening, the patient started feeling poorly and complained of abdominal pain nausea and vomiting. She did have myalgias. She denied any shortness of breath, cough, fevers, or chills. She came to the emergency room for evaluation. The patient was sent for CT scan of the head and on her return her oxygen saturation was 90% on room air dropped to the mid 80s. She was placed on 2 L nasal cannula. Apparently that very day she had gone to the [facility] and was swimming. She tells me that she is not vaccinated because \"I was told I did not need it\". On her monitor, she was noted to be bradycardic as low as 30s. However, she tells me that she has never had a problem with her heart. She is a non-smoker and denies any alcohol or drug use. She has a caregiver who lives with her. He is confused although answers most questions appropriately has been taking of her pulse oximetry and her pur wick urinary catheter multiple times. Had to be put in mitts.     ALLERGIES:  No Known Allergies    HOME MEDICATIONS:  Medications Prior to Admission: amLODIPine (NORVASC) 5 MG tablet, Take 5 mg by mouth daily  metoprolol succinate (TOPROL XL) 50 MG extended release tablet, Take 25 mg by mouth daily   POTASSIUM CHLORIDE ER PO, Take 10 mEq by mouth daily  Cholecalciferol (VITAMIN D) 50 MCG (2000 UT) CAPS capsule, Take 1 capsule by mouth daily  bumetanide (BUMEX) 0.5 MG tablet, Take 1 tablet by mouth daily      PAST MEDICAL HISTORY:  Past Medical History:   Diagnosis Date    Depression     History of total hysterectomy     Hx of breast cancer     Hypertension        PAST SURGICAL HISTORY:  No past surgical history on file. SOCIAL HISTORY:  Social History     Socioeconomic History    Marital status:      Spouse name: Not on file    Number of children: Not on file    Years of education: Not on file    Highest education level: Not on file   Occupational History    Not on file   Tobacco Use    Smoking status: Never Smoker    Smokeless tobacco: Never Used   Vaping Use    Vaping Use: Never used   Substance and Sexual Activity    Alcohol use: Never    Drug use: Never    Sexual activity: Not on file   Other Topics Concern    Not on file   Social History Narrative    Not on file     Social Determinants of Health     Financial Resource Strain: Low Risk     Difficulty of Paying Living Expenses: Not hard at all   Food Insecurity: No Food Insecurity    Worried About Running Out of Food in the Last Year: Never true    [address] in the Last Year: Never true   Transportation Needs:     Lack of Transportation (Medical): Not on file    Lack of Transportation (Non-Medical):  Not on file   Physical Activity:     Days of Exercise per Week: Not on file    Minutes of Exercise per Session: Not on file   Stress:     Feeling of Stress : Not on file   Social Connections:     Frequency of Communication with Friends and Family: Not on file    Frequency of Social Gatherings with Friends and Family: Not on file    Attends Sikhism Services: Not on file    Active Member of Clubs or Organizations: Not on file    Attends Club or Organization Meetings: Not on file    Marital Status: Not on file   Intimate Partner Violence:     Fear of Current or Ex-Partner: Not on file    Emotionally Abused: Not on file    Physically Abused: Not on file    Sexually Abused: Not on file   Housing Stability:     Unable to Pay for Housing in the Last Year: Not on file    Number of Places Lived in the Last Year: Not on file    Unstable Housing in the Last Year: Not on file       FAMILY HISTORY:  No family history on file. REVIEW OF SYSTEMS:  All other systems reviewed and are negative. PHYSICAL EXAM:  Vital Signs Blood pressure (!) 149/60, pulse (!) 49, temperature 98.3 °F (36.8 °C), temperature source Oral, resp. rate 18, height 4' 9\" (1.448 m), weight 141 lb 12.1 oz (64.3 kg), SpO2 99 %, not currently breastfeeding. Oxygen Amount and Delivery: O2 Flow Rate (L/min): 1.5 L/min    Admission Weight Weight: 175 lb (79.4 kg)    General Appearance   Elderly female in no acute respiratory distress  Head  Normocephalic, without obvious abnormality, atraumatic    Eyes  conjunctivae/corneas clear. PERRL, EOM's intact. Fundi benign. ENT clear doubt any  Neck  no adenopathy, no carotid bruit, no JVD, supple, symmetrical, trachea midline and thyroid not enlarged, symmetric, no tenderness/mass/nodules  Lungs diminished on the right but otherwise clear without any wheezes or rhonchi  Heart: regular rate and rhythm, S1, S2 normal, no murmur, click, rub or gallop  Abdomen  soft, non-tender; bowel sounds normal; no masses,  no organomegaly  Extremities  No edema  Skin  Skin color, texture, turgor normal. No rashes or lesions  Neurologic: Alert and oriented to self normal strength and tone.          Imaging  Chest x-ray shows mild elevation of right diaphragm      Lab Review  CBC     Lab Results   Component Value Date    WBC 6.9 12/15/2021    RBC 3.85 12/15/2021    HGB 11.9 12/15/2021    HCT 35.8 12/15/2021     12/15/2021    MCV 92.8 12/15/2021    MCH 31.0 12/15/2021    MCHC 33.4 12/15/2021    RDW 14.5 12/15/2021    LYMPHOPCT 3 12/14/2021    MONOPCT 6 12/14/2021    BASOPCT 0 12/14/2021    MONOSABS 0.56 12/14/2021    LYMPHSABS 0.28 12/14/2021    EOSABS 0.00 12/14/2021    BASOSABS 0.00 12/14/2021    DIFFTYPE NOT REPORTED 12/14/2021       BMP   Lab Results   Component Value Date     12/15/2021 K 4.4 12/15/2021     12/15/2021    CO2 27 12/15/2021    BUN 16 12/15/2021    CREATININE 0.77 12/15/2021    GLUCOSE 153 12/15/2021    CALCIUM 10.0 12/15/2021       LFTS  Lab Results   Component Value Date    ALKPHOS 86 12/14/2021    ALT 9 12/14/2021    AST 15 12/14/2021    PROT 6.7 12/14/2021    BILITOT 0.32 12/14/2021    LABALBU 4.3 12/14/2021       INR  No results for input(s): PROTIME, INR in the last 72 hours. APTT  No results for input(s): APTT in the last 72 hours. Lactic Acid  Lab Results   Component Value Date    LACTA 1.7 12/14/2021        PRO-BNP   No results for input(s): PROBNP in the last 72 hours. ABGs: No results found for: PHART, PO2ART, TNZ1CKE    Lab Results   Component Value Date    MODE NOT REPORTED 12/14/2021         Impression    COVID-19 infection, tested +12/14  Elevated D-dimer  History of hypertension  History of anxiety  History of early Alzheimer  Not vaccinated for Covid    Plan:      Droplet plus isolation  Wean oxygen to keep saturations greater than 88%  Check stat CT of the chest given markedly elevated D-dimer  Follow-up inflammatory markers tomorrow  Mittens needed because she is pulling at her pulse ox and her pubic catheter  We will switch over to oral Eliquis for now may need higher dose if there is a pulmonary embolism  Continue Decadron since patient was mildly hypoxic  I spoke to her caregiver Desean Torres updated her.   She says that normally, the patient is extremely active and has intermittent confusion  She was hoping to take the patient home today, but given her markedly elevated D-dimer and the fact that she is hypoxic, would wait another 24 hours at least reevaluate

## 2021-12-15 NOTE — PLAN OF CARE
Problem: Airway Clearance - Ineffective  Goal: Achieve or maintain patent airway  Outcome: Ongoing     Problem: Gas Exchange - Impaired  Goal: Absence of hypoxia  Outcome: Ongoing  Goal: Promote optimal lung function  Outcome: Ongoing     Problem: Breathing Pattern - Ineffective  Goal: Ability to achieve and maintain a regular respiratory rate  Outcome: Ongoing     Problem:  Body Temperature -  Risk of, Imbalanced  Goal: Ability to maintain a body temperature within defined limits  Outcome: Ongoing  Goal: Will regain or maintain usual level of consciousness  Outcome: Ongoing  Goal: Complications related to the disease process, condition or treatment will be avoided or minimized  Outcome: Ongoing     Problem: Isolation Precautions - Risk of Spread of Infection  Goal: Prevent transmission of infection  Outcome: Ongoing     Problem: Nutrition Deficits  Goal: Optimize nutritional status  Outcome: Ongoing     Problem: Risk for Fluid Volume Deficit  Goal: Maintain normal heart rhythm  Outcome: Ongoing  Goal: Maintain absence of muscle cramping  Outcome: Ongoing  Goal: Maintain normal serum potassium, sodium, calcium, phosphorus, and pH  Outcome: Ongoing     Problem: Loneliness or Risk for Loneliness  Goal: Demonstrate positive use of time alone when socialization is not possible  Outcome: Ongoing     Problem: Fatigue  Goal: Verbalize increase energy and improved vitality  Outcome: Ongoing     Problem: Patient Education: Go to Patient Education Activity  Goal: Patient/Family Education  Outcome: Ongoing     Problem: Pain:  Goal: Pain level will decrease  Outcome: Ongoing  Goal: Control of acute pain  Outcome: Ongoing  Goal: Control of chronic pain  Outcome: Ongoing     Problem: Skin Integrity:  Goal: Will show no infection signs and symptoms  Outcome: Ongoing  Goal: Absence of new skin breakdown  Outcome: Ongoing     Problem: Falls - Risk of:  Goal: Will remain free from falls  Outcome: Ongoing  Goal: Absence of physical injury  Outcome: Ongoing

## 2021-12-15 NOTE — CARE COORDINATION
CASE MANAGEMENT NOTE:    Admission Date:  12/14/2021 Nick Tello is a 80 y.o.  female    Admitted for : Pneumonia due to COVID-19 virus [U07.1, J12.82]  2019 novel coronavirus-infected pneumonia (NCIP) [U07.1, J12.82]    Met with:  JEYSON Nair    PCP:  Shahla Jacobsen NP                                Insurance:  Krista Chandra [employer]       Is patient alert and oriented at time of discussion:  Yes    Current Residence/ Living Arrangements:  independently at home             Current Services PTA:  No    Does patient go to outpatient dialysis: No  If yes, location and chair time: NA    Is patient agreeable to VNS: No    Freedom of choice provided:  No    List of [address] provided: NA    VNS chosen:  NA    DME:  none    Home Oxygen: No    Nebulizer: No    CPAP/BIPAP: No    Supplier: N/A    Potential Assistance Needed: No    SNF needed: No    Freedom of choice and list provided: NA    Pharmacy:  [facility]       Does Patient want to use MEDS to BEDS? No    Is patient currently receiving oral anticoagulation therapy? No    Is the Patient an [facility] with Readmission Risk Score greater than 14%? No  If yes, pt needs a follow up appointment made within 7 days. Family Members/Caregivers that pt would like involved in their care:    Yes    If yes, list name here:  Jennifer Mowers and [address]    Transportation Provider:  Sylvain Jha             Discharge Plan:  12/15 [place]orence November From home with JEYSON Nair. Pt has alzheimers and CG lives with her . Pt is independent and goes to the [facility] 3-4 times a week. CG denies need for VNS. Denies needs. Pt is on O2 per NC at 1.5 LPM. On PO decadron, lovenox sq. Per Pulm possible d/c tomorrow.  Following for home O2. //JF    Electronically signed by: Prema Carrera RN on 12/15/2021 at 11:03 AM

## 2021-12-15 NOTE — PROGRESS NOTES
Writer rhea Hogue appointed caregiver for Mei Estrada. Per Christina Zamora Patient is very active, she walks per self,and wears reading glasses. Patient has early Alzheimer so she is forgetful at times. Verify normal HR low 40's. Patient doesn't  wear oxygen at home.

## 2021-12-15 NOTE — ED NOTES
Bed: 07N  Expected date:   Expected time:   Means of arrival:   Comments:  CESAR 11      Mendez Gamble RN  12/14/21 2009

## 2021-12-15 NOTE — PROGRESS NOTES
Pt removing pulse ox several times. Writer presents to bedside, reapplied. Education provided on medical monitoring, safety. Pt states understanding, but continues with confused speech. Will continue to monitor closely.

## 2021-12-15 NOTE — ED NOTES
Pt returned from CT pt placed on cardiac  Monitor  Room air sat - 88-90% pt placed on 2.3 lpm nasal cannula     Dionne Brambila RN  12/14/21 7426

## 2021-12-15 NOTE — PROGRESS NOTES
Pt's caregiver, Rebeca Ocampo, called from anonymous number, requested to speak with Clin Lead. Informed her that Amol Cabrales is currently bedside providing pt care in another room, offered my assistance. Rebeca Ocampo declined, requesting call from Curlene Mcardle as she is available. Curlene Mcardle, RADHA notified.

## 2021-12-15 NOTE — H&P
[address] [facility]     HISTORY AND PHYSICAL EXAMINATION            Date:   12/15/2021  Patientname: Steve Conley  Date of admission:  12/14/2021  8:09 PM  MRN:   [id number]  Account:  [de-identified]  YOB: 1940  PCP:    CHARISSE Preciado NP  Room:   04/04  Code Status:    No Order    CHIEF COMPLAINT     Chief Complaint   Patient presents with    Extremity Weakness    Fatigue       HISTORY OF PRESENT ILLNESS  (Character, Onset, Location, Duration,  Exacerbating/RelievingFactors, Radiation,   Associated Symptoms, Severity )      The patient is a 80 y.o.  female, with a history of anxiety, depression, HTN, venous insufficiency, and breast cancer, who presents with extreme weakness and fatigue. At time of exam, patient is resting and majority of information is obtained via her caregiver. According to patient's caregiver, patient was fine all day yesterday. This morning she got up in the performed her usual routine, including going to the [facility] for a swim. At dinnertime, patient began feeling poorly and complained of diffuse abdominal pain, nausea, and vomiting. Symptoms are associated with fatigue. Denies fever, chills, chest pain, cough, diarrhea, and urinary symptoms. Denies known exposure to sick contacts. There are no aggravating or alleviating factors. Symptoms are reported as sudden onset, constant, and moderate. In ED, SPO2 noted to be 90% on room air but then dipped down into the mid 80s upon return from CT scan. Patient was placed on O2 at 2-1/2 L a minute per nasal cannula with SPO2 in upper 90s. PAST MEDICAL HISTORY   Patient  has a past medical history of Depression, History of total hysterectomy, Hx of breast cancer, and Hypertension. PAST SURGICAL HISTORY    Patient  has no past surgical history on file. FAMILY HISTORY    Patient family history is not on file. SOCIAL HISTORY    Patient  reports that she has never smoked.  She has never used smokeless tobacco. She reports that she does not drink alcohol and does not use drugs. HOME MEDICATIONS        Prior to Admission medications    Medication Sig Start Date End Date Taking? Authorizing Provider   amLODIPine (NORVASC) 5 MG tablet Take 5 mg by mouth daily   Yes Historical Provider, MD   metoprolol succinate (TOPROL XL) 50 MG extended release tablet Take 25 mg by mouth daily    Yes Historical Provider, MD   POTASSIUM CHLORIDE ER PO Take 10 mEq by mouth daily   Yes Historical Provider, MD   Cholecalciferol (VITAMIN D) 50 MCG (2000 UT) CAPS capsule Take 1 capsule by mouth daily   Yes Historical Provider, MD   bumetanide (BUMEX) 0.5 MG tablet Take 1 tablet by mouth daily 6/29/21  Yes CHARISSE Pichardo - FELICITA       ALLERGIES      Patient has no known allergies. REVIEW OF SYSTEMS     Review of Systems   Constitutional: Positive for fatigue. Negative for chills, diaphoresis and fever. HENT: Negative for congestion and sore throat. Respiratory: Negative for cough, shortness of breath and wheezing. Cardiovascular: Negative for chest pain, palpitations and leg swelling. Gastrointestinal: Positive for abdominal pain, nausea and vomiting. Negative for constipation and diarrhea. Genitourinary: Negative for dyspareunia, dysuria, frequency and urgency. Musculoskeletal: Negative for back pain and myalgias. Skin: Negative for rash. Neurological: Positive for weakness (Generalized). Negative for dizziness, tremors and headaches. Psychiatric/Behavioral: The patient is not nervous/anxious. PHYSICAL EXAM      BP (!) 82/39   Pulse (!) 47   Temp 98.2 °F (36.8 °C)   Resp 16   Wt 175 lb (79.4 kg)   SpO2 (!) 89%   BMI 37.87 kg/m²  Body mass index is 37.87 kg/m². Physical Exam  Constitutional:       General: She is not in acute distress. Appearance: She is well-developed. She is not diaphoretic. HENT:      Head: Normocephalic and atraumatic.    Eyes: Conjunctiva/sclera: Conjunctivae normal.      Pupils: Pupils are equal, round, and reactive to light. Neck:      Trachea: No tracheal deviation. Cardiovascular:      Rate and Rhythm: Regular rhythm. Bradycardia present. Heart sounds: Normal heart sounds. No murmur heard. No friction rub. No gallop. Pulmonary:      Effort: Pulmonary effort is normal. No respiratory distress. Breath sounds: Normal breath sounds. No wheezing or rales. Chest:      Chest wall: No tenderness. Abdominal:      General: Bowel sounds are normal. There is no distension. Palpations: Abdomen is soft. Tenderness: There is abdominal tenderness (mild diffuse). There is no guarding. Musculoskeletal:         General: No tenderness. Normal range of motion. Cervical back: Normal range of motion and neck supple. Lymphadenopathy:      Cervical: No cervical adenopathy. Skin:     General: Skin is warm and dry. Coloration: Skin is not pale. Findings: No erythema or rash. Neurological:      Mental Status: She is alert and oriented to person, place, and time. Motor: No seizure activity. Coordination: Coordination normal.   Psychiatric:         Behavior: Behavior normal.         Thought Content:  Thought content normal.       DIAGNOSTICS      EKG:   EKG 12 Lead [[id number]]    Collected: 12/14/21 2049    Updated: 12/14/21 2052     Ventricular Rate 51 BPM    Atrial Rate 51 BPM    P-R Interval 196 ms    QRS Duration 78 ms    Q-T Interval 484 ms    QTc Calculation (Bazett) 446 ms    P Axis 38 degrees    R Axis 44 degrees    T Axis 63 degrees   Narrative:     Sinus bradycardia with Premature atrial complexes in a pattern of bigeminy   Cannot rule out Anterior infarct , age undetermined   Abnormal ECG   No previous ECGs available     Recent Labs     12/14/21  2100 12/14/21 2126   WBC  --  9.3   LYMPHOPCT  --  3*   TROPHS 14  --        Recent Labs     12/14/21 2113   COVID19 DETECTED*     Labs:  CBC: Recent Labs     12/14/21 2126   WBC 9.3   HGB 12.0        BMP:    Recent Labs     12/14/21  2100      K 3.7   CL 99   CO2 24   BUN 16   CREATININE 0.86   GLUCOSE 165*     S. Calcium:  Recent Labs     12/14/21  2100   CALCIUM 9.6     S. Ionized Calcium:No results for input(s): IONCA in the last 72 hours. S. Magnesium:  Recent Labs     12/14/21  2100   MG 2.1     S. Phosphorus:No results for input(s): PHOS in the last 72 hours. S. Glucose:No results for input(s): POCGLU in the last 72 hours. Glycosylated hemoglobin A1C:   Lab Results   Component Value Date    LABA1C 5.4 06/28/2021     Hepatic:   Recent Labs     12/14/21  2100   AST 15   ALT 9   ALKPHOS 86     CARDIAC ENZY:   Recent Labs     12/14/21  0015 12/14/21  2100   TROPHS 16* 14     INR: No results for input(s): INR in the last 72 hours. BNP: No results for input(s): PROBNP in the last 72 hours. ABGs: No results for input(s): PH, PCO2, PO2, HCO3, O2SAT in the last 72 hours. Lipids: No results for input(s): CHOL, TRIG, HDL, LDLCALC in the last 72 hours. Invalid input(s): LDL  Pancreatic functions:  Recent Labs     12/14/21  2100   LIPASE 20     S. LacticAcid:   Recent Labs     12/14/21  2100   LACTA 1.7     Thyroid functions:   Lab Results   Component Value Date    TSH 2.13 06/28/2021      U/A:No results for input(s): NITRITE, COLORU, WBCUA, RBCUA, MUCUS, BACTERIA, CLARITYU, SPECGRAV, LEUKOCYTESUR, BLOODU, GLUCOSEU, AMORPHOUS in the last 72 hours. Invalid input(s): Abhijit Vazquez  Recent Labs     12/14/21 2113   COVID19 DETECTED*     Imaging/Diagonstics:     CT HEAD WO CONTRAST    Result Date: 12/14/2021  EXAMINATION: CT OF THE HEAD WITHOUT CONTRAST  12/14/2021 6:53 pm TECHNIQUE: CT of the head was performed without the administration of intravenous contrast. Dose modulation, iterative reconstruction, and/or weight based adjustment of the mA/kV was utilized to reduce the radiation dose to as low as reasonably achievable. COMPARISON: None. junction. Scattered probable atelectatic changes; elevated right hemidiaphragm. No consolidation or sizable pleural effusion. Minimal patchy infiltrate remains a consideration; correlate clinically and obtain follow-up CXR imaging as indicated. ASSESSMENT  and  PLAN     Principal Problem:    Pneumonia due to COVID-19 virus  Active Problems:    Essential hypertension  Resolved Problems:    * No resolved hospital problems. *    Plan:    Pneumonia due to Covid -19  -Rapid Covid swab positive in ED  -Influenza A and B not detected  -CXR-scattered probable atelectatic changes; elevated right hemidiaphragm  -minimal patchy infiltrate  -Lactic acid 1.7  -SpO2 -90% room air  --Dropped to 86% after CT  ---SPO2 currently upper 90s on 2-1/2 L/min   -VBG completely within normal limits  --pH 7.357  -Decadron 6 mg p.o. initiated in ED  -Pulmonology consult  -Check inflammatory labs in a.m.  -Droplet Plus isolation precautions  -CT head -no acute intracranial abnormalities  -Troponin high-sensitivity 14, then 16    HTN  -Continue home BP meds  -Monitor VS    Consultations:     None      CHARISSE Porter - KAMILLE   12/15/2021  2:40 AM    [address], [address] [place] [address].    Phone [phone]     Agree with H&P, recorded by Vikas Burns CNP  Patient has history of hypertension, breast cancer, Alzheimer disease  History is extremely limited, most of the data was retrieved from E HR  Since patient is unvaccinated, admitted with shortness of breath, hypoxemia,  Has episode of bradycardia, asymptomatic as per nursing staff  It seems patient has a caregiver, she told the nursing staff that her heart rate always stays between 40 and 50  ordering TSH  We will keep Lopressor on hold  Patient started on Eliquis with elevated D-dimer  CTA chest is negative for PE  Overall prognosis is guarded, with advanced age, unvaccinated status

## 2021-12-15 NOTE — PROGRESS NOTES
Writer arrives at bedside to find pt removed pulse ox, pulsed oxygen off, removed purewick, removed piv x 2. Bilat mitts applied as ordered. Abisai Bradley notified.

## 2021-12-15 NOTE — ED PROVIDER NOTES
capsule by mouth daily      bumetanide (BUMEX) 0.5 MG tablet Take 1 tablet by mouth daily  Qty: 90 tablet, Refills: 1    Associated Diagnoses: Essential hypertension             ALLERGIES     has No Known Allergies. FAMILY HISTORY     has no family status information on file. SOCIAL HISTORY      reports that she has never smoked. She has never used smokeless tobacco. She reports that she does not drink alcohol and does not use drugs. PHYSICAL EXAM     INITIAL VITALS: BP (!) 117/32   Pulse (!) 44   Temp 97.9 °F (36.6 °C) (Oral)   Resp 12   Ht 4' 9\" (1.448 m)   Wt 141 lb 12.1 oz (64.3 kg)   SpO2 95%   BMI 30.68 kg/m²   Gen: Appears weak and fatigued  Head: Normocephalic, atraumatic  Eye: Pupils equal round reactive to light, no conjunctivitis  ENT:  dry oral mucosa  Neck: No JVD  Heart bradycardic with a regular rhythm no murmurs  Lungs: Basilar crackles bilaterally, no respiratory distress  Chest wall: No crepitus, no tenderness palpation  Abdomen: Soft, nontender, nondistended, with no peritoneal signs  Neurologic: Patient is alert and oriented x3, motor and sensation is intact in all 4 extremities, fluent speech  Extremities: No calf tenderness, no unequal edema. MEDICAL DECISION MAKING:     MDM  80 y.o. female presenting with nausea vomiting weakness and fatigue. Will rule out atypical ACS, anemia, renal failure, electrolyte abnormalities, Covid, with the vomiting and her history of breast cancer we will get a CT scan to make sure she does not have any brain tumor. We will start her on IV fluids and reassess. Emergency Department course:  Blood gas shows no hypercapnia  No lactic acid elevation  White count normal  No anemia  No renal failure  Covid positive  Patient is hypoxic in the 80s and put on nasal cannula oxygen. Treated with Decadron.   Discussed with the internal medicine service, will admit to the hospital.  The internal medicine NP Gordy Madrigal reports to me that she will have the pulmonary medicine service contacted. DIAGNOSTIC RESULTS     EKG: All EKG's are interpreted by the Emergency Department Physician who either signs or Co-signs this chart in the absence of a cardiologist.    EKG shows a sinus bradycardia  HR is 51, , QRS 78, , no ANAHY, No STD, No TWI, the axis is normal.      RADIOLOGY:All plain film, CT, MRI, and formal ultrasound images (except ED bedside ultrasound) are read by the radiologist and the images and interpretations are directly viewed by the emergency physician. CT HEAD WO CONTRAST   Final Result   Mild central and cortical cerebral atrophy. Mild chronic deep white matter ischemic changes      No acute intracranial abnormalities are noted. RECOMMENDATIONS:   Unavailable         XR CHEST PORTABLE   Final Result   Scattered probable atelectatic changes; elevated right hemidiaphragm. No   consolidation or sizable pleural effusion. Minimal patchy infiltrate remains   a consideration; correlate clinically and obtain follow-up CXR imaging as   indicated. LABS: All lab results were reviewed by myself, and all abnormals are listed below.   Labs Reviewed   COVID-19 & INFLUENZA COMBO - Abnormal; Notable for the following components:       Result Value    SARS-CoV-2 RNA, RT PCR DETECTED (*)     All other components within normal limits   COMPREHENSIVE METABOLIC PANEL - Abnormal; Notable for the following components:    Glucose 165 (*)     All other components within normal limits   TROPONIN - Abnormal; Notable for the following components:    Troponin, High Sensitivity 16 (*)     All other components within normal limits   CBC WITH AUTO DIFFERENTIAL - Abnormal; Notable for the following components:    RBC 3.85 (*)     Hematocrit 35.6 (*)     Seg Neutrophils 75 (*)     Lymphocytes 3 (*)     Bands 16 (*)     Absolute Lymph # 0.28 (*)     Absolute Bands # 1.49 (*)     All other components within normal limits   CBC - Abnormal; Notable for the following components:    RBC 3.85 (*)     Hemoglobin 11.9 (*)     Hematocrit 35.8 (*)     All other components within normal limits   TROPONIN   LIPASE   LACTIC ACID   BLOOD GAS, VENOUS   MAGNESIUM   SPECIMEN REJECTION   D-DIMER, QUANTITATIVE   C-REACTIVE PROTEIN   PROCALCITONIN   LACTATE DEHYDROGENASE   FERRITIN   BASIC METABOLIC PANEL W/ REFLEX TO MG FOR LOW K       EMERGENCY DEPARTMENT COURSE:   Vitals:    Vitals:    12/15/21 0330 12/15/21 0400 12/15/21 0430 12/15/21 0500   BP: (!) 154/42 (!) 131/44 (!) 136/40 (!) 117/32   Pulse: 65 (!) 45 (!) 47 (!) 44   Resp: 19 21 16 12   Temp: 97.9 °F (36.6 °C)      TempSrc: Oral      SpO2: 96% 94% 97% 95%   Weight: 141 lb 12.1 oz (64.3 kg)      Height: 4' 9\" (1.448 m)          The patient was given the following medications while in the emergency department:  Orders Placed This Encounter   Medications    dexamethasone (DECADRON) tablet 6 mg    amLODIPine (NORVASC) tablet 5 mg    bumetanide (BUMEX) tablet 0.5 mg    Vitamin D (CHOLECALCIFEROL) tablet 2,000 Units    metoprolol succinate (TOPROL XL) extended release tablet 25 mg    potassium chloride (MICRO-K) extended release capsule 10 mEq    sodium chloride flush 0.9 % injection 5-40 mL    sodium chloride flush 0.9 % injection 10 mL    0.9 % sodium chloride infusion    enoxaparin (LOVENOX) injection 40 mg    OR Linked Order Group     ondansetron (ZOFRAN-ODT) disintegrating tablet 4 mg     ondansetron (ZOFRAN) injection 4 mg    magnesium hydroxide (MILK OF MAGNESIA) 400 MG/5ML suspension 30 mL    OR Linked Order Group     acetaminophen (TYLENOL) tablet 650 mg     acetaminophen (TYLENOL) suppository 650 mg    dexamethasone (DECADRON) tablet 6 mg     -------------------------  CRITICAL CARE:   CONSULTS: IP CONSULT TO PULMONOLOGY  PROCEDURES: Procedures     FINAL IMPRESSION      1. 2019 novel coronavirus-infected pneumonia (NCIP)          DISPOSITION/PLAN   DISPOSITION Admitted 12/14/2021 10:52:56 PM      PATIENT REFERRED TO:  No follow-up provider specified.     DISCHARGE MEDICATIONS:  Current Discharge Medication List            Eda Gabriel MD  Attending Emergency Physician                     Eda Gabriel MD  12/15/21 5501

## 2021-12-16 ENCOUNTER — TELEPHONE (OUTPATIENT)
Dept: FAMILY MEDICINE CLINIC | Age: 81
End: 2021-12-16

## 2021-12-16 VITALS
TEMPERATURE: 99.9 F | WEIGHT: 141.76 LBS | HEIGHT: 57 IN | BODY MASS INDEX: 30.58 KG/M2 | HEART RATE: 87 BPM | RESPIRATION RATE: 10 BRPM | DIASTOLIC BLOOD PRESSURE: 58 MMHG | OXYGEN SATURATION: 93 % | SYSTOLIC BLOOD PRESSURE: 96 MMHG

## 2021-12-16 LAB
ANION GAP SERPL CALCULATED.3IONS-SCNC: 11 MMOL/L (ref 9–17)
BUN BLDV-MCNC: 19 MG/DL (ref 8–23)
BUN/CREAT BLD: ABNORMAL (ref 9–20)
C-REACTIVE PROTEIN: 7.9 MG/L (ref 0–5)
CALCIUM SERPL-MCNC: 9.3 MG/DL (ref 8.6–10.4)
CHLORIDE BLD-SCNC: 103 MMOL/L (ref 98–107)
CO2: 26 MMOL/L (ref 20–31)
CREAT SERPL-MCNC: 1.11 MG/DL (ref 0.5–0.9)
D-DIMER QUANTITATIVE: 0.49 MG/L FEU (ref 0–0.59)
FERRITIN: 174 UG/L (ref 13–150)
GFR AFRICAN AMERICAN: 57 ML/MIN
GFR NON-AFRICAN AMERICAN: 47 ML/MIN
GFR SERPL CREATININE-BSD FRML MDRD: ABNORMAL ML/MIN/{1.73_M2}
GFR SERPL CREATININE-BSD FRML MDRD: ABNORMAL ML/MIN/{1.73_M2}
GLUCOSE BLD-MCNC: 104 MG/DL (ref 70–99)
HCT VFR BLD CALC: 37.8 % (ref 36–46)
HEMOGLOBIN: 12.8 G/DL (ref 12–16)
LACTATE DEHYDROGENASE: 160 U/L (ref 135–214)
MCH RBC QN AUTO: 30.6 PG (ref 26–34)
MCHC RBC AUTO-ENTMCNC: 33.8 G/DL (ref 31–37)
MCV RBC AUTO: 90.6 FL (ref 80–100)
NRBC AUTOMATED: NORMAL PER 100 WBC
PDW BLD-RTO: 14 % (ref 11.5–14.9)
PLATELET # BLD: 206 K/UL (ref 150–450)
PMV BLD AUTO: 9.2 FL (ref 6–12)
POTASSIUM SERPL-SCNC: 3.7 MMOL/L (ref 3.7–5.3)
RBC # BLD: 4.18 M/UL (ref 4–5.2)
SODIUM BLD-SCNC: 140 MMOL/L (ref 135–144)
WBC # BLD: 10.2 K/UL (ref 3.5–11)

## 2021-12-16 PROCEDURE — 97116 GAIT TRAINING THERAPY: CPT

## 2021-12-16 PROCEDURE — 80048 BASIC METABOLIC PNL TOTAL CA: CPT

## 2021-12-16 PROCEDURE — 82728 ASSAY OF FERRITIN: CPT

## 2021-12-16 PROCEDURE — 2580000003 HC RX 258: Performed by: NURSE PRACTITIONER

## 2021-12-16 PROCEDURE — 94664 DEMO&/EVAL PT USE INHALER: CPT

## 2021-12-16 PROCEDURE — 6370000000 HC RX 637 (ALT 250 FOR IP): Performed by: INTERNAL MEDICINE

## 2021-12-16 PROCEDURE — 6370000000 HC RX 637 (ALT 250 FOR IP): Performed by: NURSE PRACTITIONER

## 2021-12-16 PROCEDURE — 99239 HOSP IP/OBS DSCHRG MGMT >30: CPT | Performed by: INTERNAL MEDICINE

## 2021-12-16 PROCEDURE — 85027 COMPLETE CBC AUTOMATED: CPT

## 2021-12-16 PROCEDURE — 97162 PT EVAL MOD COMPLEX 30 MIN: CPT

## 2021-12-16 PROCEDURE — 6360000002 HC RX W HCPCS: Performed by: NURSE PRACTITIONER

## 2021-12-16 PROCEDURE — 85379 FIBRIN DEGRADATION QUANT: CPT

## 2021-12-16 PROCEDURE — 36415 COLL VENOUS BLD VENIPUNCTURE: CPT

## 2021-12-16 PROCEDURE — 83615 LACTATE (LD) (LDH) ENZYME: CPT

## 2021-12-16 PROCEDURE — 86140 C-REACTIVE PROTEIN: CPT

## 2021-12-16 RX ORDER — LOSARTAN POTASSIUM 50 MG/1
50 TABLET ORAL DAILY
Qty: 30 TABLET | Refills: 3 | Status: SHIPPED | OUTPATIENT
Start: 2021-12-16

## 2021-12-16 RX ORDER — LOSARTAN POTASSIUM 50 MG/1
50 TABLET ORAL DAILY
Status: DISCONTINUED | OUTPATIENT
Start: 2021-12-16 | End: 2021-12-16 | Stop reason: HOSPADM

## 2021-12-16 RX ORDER — DEXAMETHASONE 6 MG/1
6 TABLET ORAL DAILY
Qty: 8 TABLET | Refills: 0 | Status: SHIPPED | OUTPATIENT
Start: 2021-12-17 | End: 2021-12-27

## 2021-12-16 RX ADMIN — Medication 2000 UNITS: at 09:31

## 2021-12-16 RX ADMIN — POTASSIUM CHLORIDE 10 MEQ: 10 CAPSULE, COATED, EXTENDED RELEASE ORAL at 09:30

## 2021-12-16 RX ADMIN — SODIUM CHLORIDE, PRESERVATIVE FREE 10 ML: 5 INJECTION INTRAVENOUS at 09:32

## 2021-12-16 RX ADMIN — AMLODIPINE BESYLATE 5 MG: 5 TABLET ORAL at 09:31

## 2021-12-16 RX ADMIN — DEXAMETHASONE 6 MG: 4 TABLET ORAL at 09:30

## 2021-12-16 RX ADMIN — BUMETANIDE 0.5 MG: 1 TABLET ORAL at 09:30

## 2021-12-16 RX ADMIN — APIXABAN 5 MG: 5 TABLET, FILM COATED ORAL at 09:30

## 2021-12-16 ASSESSMENT — PAIN SCALES - GENERAL
PAINLEVEL_OUTOF10: 0
PAINLEVEL_OUTOF10: 10
PAINLEVEL_OUTOF10: 0

## 2021-12-16 NOTE — PLAN OF CARE
Problem: Airway Clearance - Ineffective  Goal: Achieve or maintain patent airway  12/15/2021 1949 by Maximus Fine RN  Outcome: Ongoing  12/15/2021 0729 by Gabriela Gustafson RN  Outcome: Ongoing     Problem: Gas Exchange - Impaired  Goal: Absence of hypoxia  12/15/2021 1949 by Maximus Fine RN  Outcome: Ongoing  12/15/2021 0729 by Gabriela Gustafson RN  Outcome: Ongoing  Goal: Promote optimal lung function  12/15/2021 1949 by Maximus Fine RN  Outcome: Ongoing  12/15/2021 0729 by Gabriela Gustafson RN  Outcome: Ongoing     Problem: Breathing Pattern - Ineffective  Goal: Ability to achieve and maintain a regular respiratory rate  12/15/2021 1949 by Maximus Fine RN  Outcome: Ongoing  12/15/2021 0729 by Gabriela Gustafson RN  Outcome: Ongoing     Problem:  Body Temperature -  Risk of, Imbalanced  Goal: Ability to maintain a body temperature within defined limits  12/15/2021 1949 by Maximus Fine RN  Outcome: Ongoing  12/15/2021 0729 by Gabriela Gustafson RN  Outcome: Ongoing  Goal: Will regain or maintain usual level of consciousness  12/15/2021 1949 by Maximus Fine RN  Outcome: Ongoing  12/15/2021 0729 by Gabriela Gustafson RN  Outcome: Ongoing  Goal: Complications related to the disease process, condition or treatment will be avoided or minimized  12/15/2021 1949 by Maximus Fine RN  Outcome: Ongoing  12/15/2021 0729 by Gabriela Gustafson RN  Outcome: Ongoing     Problem: Isolation Precautions - Risk of Spread of Infection  Goal: Prevent transmission of infection  12/15/2021 1949 by Maximus Fine RN  Outcome: Ongoing  12/15/2021 0729 by Gabriela Gustafson RN  Outcome: Ongoing     Problem: Nutrition Deficits  Goal: Optimize nutritional status  12/15/2021 1949 by Maximus Fine RN  Outcome: Ongoing  12/15/2021 0729 by Gabriela Gustafson RN  Outcome: Ongoing     Problem: Risk for Fluid Volume Deficit  Goal: Maintain normal heart rhythm  12/15/2021 1949 by Maximus Fine RN  Outcome: Ongoing  12/15/2021 0729 by Lelia Nichols RN  Outcome: Ongoing  Goal: Maintain absence of muscle cramping  12/15/2021 1949 by Michelle Bird RN  Outcome: Ongoing  12/15/2021 0729 by Lelia Nichols RN  Outcome: Ongoing  Goal: Maintain normal serum potassium, sodium, calcium, phosphorus, and pH  12/15/2021 1949 by Michelle Bird RN  Outcome: Ongoing  12/15/2021 0729 by Lelia Nichols RN  Outcome: Ongoing     Problem: Loneliness or Risk for Loneliness  Goal: Demonstrate positive use of time alone when socialization is not possible  12/15/2021 1949 by Michelle Bird RN  Outcome: Ongoing  12/15/2021 0729 by Lelia Nichols RN  Outcome: Ongoing     Problem: Fatigue  Goal: Verbalize increase energy and improved vitality  12/15/2021 1949 by Michelle Bird RN  Outcome: Ongoing  12/15/2021 0729 by Lelia Nichols RN  Outcome: Ongoing     Problem: Patient Education: Go to Patient Education Activity  Goal: Patient/Family Education  12/15/2021 1949 by Michelle Bird RN  Outcome: Ongoing  12/15/2021 0729 by Lelia Nichols RN  Outcome: Ongoing     Problem: Pain:  Description: Pain management should include both nonpharmacologic and pharmacologic interventions.   Goal: Pain level will decrease  Description: Pain level will decrease  12/15/2021 1949 by Michelle Bird RN  Outcome: Ongoing  12/15/2021 0729 by Lelia Nichols RN  Outcome: Ongoing  Goal: Control of acute pain  Description: Control of acute pain  12/15/2021 1949 by Michelle Bird RN  Outcome: Ongoing  12/15/2021 0729 by Lelia Nichols RN  Outcome: Ongoing  Goal: Control of chronic pain  Description: Control of chronic pain  12/15/2021 1949 by Michelle Bird RN  Outcome: Ongoing  12/15/2021 0729 by Lelia Nichols RN  Outcome: Ongoing     Problem: Skin Integrity:  Goal: Will show no infection signs and symptoms  Description: Will show no infection signs and symptoms  12/15/2021 1949 by Gala Klinefelter Jason Castro RN  Outcome: Ongoing  12/15/2021 0729 by Isabelle Hein RN  Outcome: Ongoing  Goal: Absence of new skin breakdown  Description: Absence of new skin breakdown  12/15/2021 1949 by Heraclio Polanco RN  Outcome: Ongoing  12/15/2021 0729 by Isabelle Hein RN  Outcome: Ongoing     Problem: Falls - Risk of:  Goal: Will remain free from falls  Description: Will remain free from falls  12/15/2021 1949 by Heraclio Polanco RN  Outcome: Ongoing  12/15/2021 0729 by Isabelle Hein RN  Outcome: Ongoing  Goal: Absence of physical injury  Description: Absence of physical injury  12/15/2021 1949 by Heraclio Polanco RN  Outcome: Ongoing  12/15/2021 0729 by Isabelle Hein RN  Outcome: Ongoing     Problem: Non-Violent Restraints  Goal: Removal from restraints as soon as assessed to be safe  Outcome: Ongoing  Goal: No harm/injury to patient while restraints in use  Outcome: Ongoing  Goal: Patient's dignity will be maintained  Outcome: Ongoing

## 2021-12-16 NOTE — FLOWSHEET NOTE
Prayed outside patient's room     12/16/21 6020   Encounter Summary   Services provided to: Patient   Referral/Consult From: Rounding   Complexity of Encounter Low   Length of Encounter 15 minutes   Spiritual/Sabianism   Type Spiritual support   Intervention Prayer

## 2021-12-16 NOTE — CARE COORDINATION
DISCHARGE PLANNING NOTE:    New orders for home O2 faxed to [facility]. Arnaldo Meek from [facility] notified of need for o2 for dischgarge today. Awaiting return call. Writer called Cathy BENÍTEZ to notify her of Eliquis card to take to pharmacy. Card given to patient's nurse for discharge. Electronically signed by Mychal Cazares RN on 12/16/2021 at 1:46 PM     Portable 02 tank delivered per [facility]. 02 tank turned on and noted to be full. Patient understands to call [facility]  immediately upon arrival home to have 02 concentrator delivered.     Electronically signed by Mychal Cazares RN on 12/16/2021 at 3:30 PM

## 2021-12-16 NOTE — PLAN OF CARE
Problem: Airway Clearance - Ineffective  Goal: Achieve or maintain patent airway  12/16/2021 0527 by Tish Paget, RN  Outcome: Ongoing  12/15/2021 1949 by Leroy Hernandez RN  Outcome: Ongoing     Problem: Gas Exchange - Impaired  Goal: Absence of hypoxia  12/16/2021 0527 by Tish Paget, RN  Outcome: Ongoing  12/15/2021 1949 by Leroy Hernandez RN  Outcome: Ongoing  Goal: Promote optimal lung function  12/16/2021 0527 by Tish Paget, RN  Outcome: Ongoing  12/15/2021 1949 by Leroy Hernandez RN  Outcome: Ongoing     Problem: Breathing Pattern - Ineffective  Goal: Ability to achieve and maintain a regular respiratory rate  12/16/2021 0527 by Tish Paget, RN  Outcome: Ongoing  12/15/2021 1949 by Leroy Hernandez RN  Outcome: Ongoing     Problem:  Body Temperature -  Risk of, Imbalanced  Goal: Ability to maintain a body temperature within defined limits  12/16/2021 0527 by Tish Paget, RN  Outcome: Ongoing  12/15/2021 1949 by Leroy Hernandez RN  Outcome: Ongoing  Goal: Will regain or maintain usual level of consciousness  12/16/2021 0527 by Tish Paget, RN  Outcome: Ongoing  12/15/2021 1949 by Leroy Hernandez RN  Outcome: Ongoing  Goal: Complications related to the disease process, condition or treatment will be avoided or minimized  12/16/2021 0527 by Tish Paget, RN  Outcome: Ongoing  12/15/2021 1949 by Leroy Hernandez RN  Outcome: Ongoing     Problem: Isolation Precautions - Risk of Spread of Infection  Goal: Prevent transmission of infection  12/16/2021 0527 by Tish Paget, RN  Outcome: Ongoing  12/15/2021 1949 by Leroy Hernandez RN  Outcome: Ongoing     Problem: Nutrition Deficits  Goal: Optimize nutritional status  12/16/2021 0527 by Tish Paget, RN  Outcome: Ongoing  12/15/2021 1949 by Leroy Hernandez RN  Outcome: Ongoing     Problem: Risk for Fluid Volume Deficit  Goal: Maintain normal heart rhythm  12/16/2021 0527 by Tish Paget, RN  Outcome: Ongoing  12/15/2021 1949 by Holland Rodríguez Odette Nguyen RN  Outcome: Ongoing  Goal: Maintain absence of muscle cramping  12/16/2021 0527 by Vignesh Vila RN  Outcome: Ongoing  12/15/2021 1949 by Bill Rich RN  Outcome: Ongoing  Goal: Maintain normal serum potassium, sodium, calcium, phosphorus, and pH  12/16/2021 0527 by Vignesh Vila RN  Outcome: Ongoing  12/15/2021 1949 by Bill Rich RN  Outcome: Ongoing     Problem: Loneliness or Risk for Loneliness  Goal: Demonstrate positive use of time alone when socialization is not possible  12/16/2021 0527 by Vignesh Vila RN  Outcome: Ongoing  12/15/2021 1949 by Bill Rich RN  Outcome: Ongoing     Problem: Fatigue  Goal: Verbalize increase energy and improved vitality  12/16/2021 0527 by Vignesh Vila RN  Outcome: Ongoing  12/15/2021 1949 by Bill Rich RN  Outcome: Ongoing     Problem: Patient Education: Go to Patient Education Activity  Goal: Patient/Family Education  12/16/2021 0527 by Vignesh Vila RN  Outcome: Ongoing  12/15/2021 1949 by Bill Rich RN  Outcome: Ongoing     Problem: Pain:  Goal: Pain level will decrease  Description: Pain level will decrease  12/16/2021 0527 by Vignesh Vila RN  Outcome: Ongoing  12/15/2021 1949 by Bill Rich RN  Outcome: Ongoing  Goal: Control of acute pain  Description: Control of acute pain  12/16/2021 0527 by Vignesh Vila RN  Outcome: Ongoing  12/15/2021 1949 by Bill Rich RN  Outcome: Ongoing  Goal: Control of chronic pain  Description: Control of chronic pain  12/16/2021 0527 by Vignesh Vila RN  Outcome: Ongoing  12/15/2021 1949 by Bill Rich RN  Outcome: Ongoing     Problem: Skin Integrity:  Goal: Will show no infection signs and symptoms  Description: Will show no infection signs and symptoms  12/16/2021 0527 by Vignesh Vila RN  Outcome: Ongoing  12/15/2021 1949 by Bill Rich RN  Outcome: Ongoing  Goal: Absence of new skin breakdown  Description: Absence of new skin breakdown  12/16/2021 0527 by Sae Kwan Aziza Macias RN  Outcome: Ongoing  12/15/2021 1949 by Roberta Bass RN  Outcome: Ongoing     Problem: Falls - Risk of:  Goal: Will remain free from falls  Description: Will remain free from falls  12/16/2021 0527 by Khushi Gutierrez RN  Outcome: Ongoing  12/15/2021 1949 by Roberta Bass RN  Outcome: Ongoing  Goal: Absence of physical injury  Description: Absence of physical injury  12/16/2021 0527 by Khushi Gutierrez RN  Outcome: Ongoing  12/15/2021 1949 by Roberta Bass RN  Outcome: Ongoing     Problem: Non-Violent Restraints  Goal: Removal from restraints as soon as assessed to be safe  12/16/2021 0527 by Khushi Gutierrez RN  Outcome: Ongoing  12/15/2021 1949 by Roberta Bass RN  Outcome: Ongoing  Goal: No harm/injury to patient while restraints in use  12/16/2021 0527 by Khushi Gutierrez RN  Outcome: Ongoing  12/15/2021 1949 by Roberta Bass RN  Outcome: Ongoing  Goal: Patient's dignity will be maintained  12/16/2021 0527 by Khushi Gutierrez RN  Outcome: Ongoing  12/15/2021 1949 by Roberta Bass RN  Outcome: Ongoing

## 2021-12-16 NOTE — PROGRESS NOTES
Spoke with patient's legal guardian Sadia Whitten regarding patient. Per Tasha Chacon to update Crystal Nair on patient condition and give Crystal Nair information regarding patient condition.

## 2021-12-16 NOTE — PROGRESS NOTES
Long discussion held with Crystal Nair, patient's caregiver, regarding discharge instructions and prescriptions. Verbalizes understanding to all.

## 2021-12-16 NOTE — TELEPHONE ENCOUNTER
----- Message from Almon Prader sent at 12/16/2021  2:10 PM EST -----  Subject: Hospital Follow Up    QUESTIONS  What hospital was the Patient Discharged from? [facility]  Date of Discharge? 2021-12-16  Discharge Location? Home  Reason for hospitalization as patient stated? Care giver Neeru oRsa) has a   few questions prior to making appt for patient. Please Mary Severance   [phone]  What question does the patient have, if applicable?   ---------------------------------------------------------------------------  --------------  CALL BACK INFO  What is the best way for the office to contact you? OK to leave message on   voicemail  Preferred Call Back Phone Number? [phone]  ---------------------------------------------------------------------------  --------------  SCRIPT ANSWERS  Relationship to Patient? Third Party  Representative Name? Bridgette-care giver  (Patient requests to see provider urgently. )? No  (Has the patient been discharged from the hospital within 2 business days   AND does not have a Telephone Encounter  Follow Up From [address] [place] [address]   documented in [address]?)? No  Do you have any questions for your primary care provider that need to be   answered prior to your appointment? (Use RN Triage if question pertains to   anything on the red flag list)? Yes  (Patient needs follow up visit after hospital discharge) Book first   available appointment within 7 days OF DISCHARGE, if no appt, proceed to   book the next available time slot within 14 days OF DISCHARGE AND Send   Message to Provider. [facility] Follow Up appointment cannot be booked   beyond 14 Days and should result in a Message to Provider. ?  Yes

## 2021-12-16 NOTE — DISCHARGE SUMMARY
[place] [address] Internal Medicine    Discharge Summary     Patient ID: Blake Mondragon  :  1940   MRN: [id number]     ACCOUNT:  [de-identified]   Patient's PCP: CHARISSE Orourke NP  Admit Date: 2021   Discharge Date: 2021    Length of Stay: 2  Code Status:  Full Code  Admitting Physician: Stephany Estrada MD  Discharge Physician: Stephany Estrada MD     Active Discharge Diagnoses:     Primary Problem  Pneumonia due to COVID-19 virus      [place] Problems    Diagnosis Date Noted    Pneumonia due to COVID-19 virus [U07.1, J12.82] 2021    Essential hypertension [I10] 2021       Admission Condition:  Poor     Discharged Condition: fair    Hospital Stay:     Hospital Course: Blake Mondragon is a 80 y.o. female who was admitted for the management of Pneumonia due to COVID-19 virus , presented to ER with Extremity Weakness and Fatigue  Patient has history of hypertension, breast cancer, Alzheimer disease  History is extremely limited, most of the data was retrieved from [facility]  Since patient is unvaccinated, admitted with shortness of breath, hypoxemia, patient found positive for COVID-19 infection  Patient had episode of bradycardia while in hospital TSH was okay.   Discontinued metoprolol, blood pressure still high, starting patient on Cozaar  Patient will follow with pulmonology as outpatient  Getting discharged on Eliquis for 30 days with elevated D-dimer        Significant therapeutic interventions:     Significant Diagnostic Studies:   Labs / Micro:        ,     Radiology:    CT HEAD WO CONTRAST    Result Date: 2021  EXAMINATION: CT OF THE HEAD WITHOUT CONTRAST  2021 6:53 pm TECHNIQUE: CT of the head was performed without the administration of intravenous contrast. Dose modulation, iterative reconstruction, and/or weight based adjustment of the mA/kV was utilized to reduce the radiation dose to as low as reasonably achievable. COMPARISON: None. HISTORY: ORDERING SYSTEM PROVIDED HISTORY: n/v/weakness TECHNOLOGIST PROVIDED HISTORY: n/v/weakness Decision Support Exception - unselect if not a suspected or confirmed emergency medical condition->Emergency Medical Condition ([place]) Reason for Exam: weakness. covid positive Relevant Medical/Surgical History: history breast cancer FINDINGS: BRAIN/VENTRICLES: The cerebral hemispheres, brainstem, and cerebellum have a normal appearance for the patient's age. The falx is midline. The ventricles and peripheral sulci are mildly dilated. There is decreased attenuation in the periventricular white matter. There is no sign of a space occupying lesion, infarction, or hemorrhage. Orbits: Portion of the orbits demonstrate no acute abnormality. SINUSES: . The  imaged portions of the paranasal sinuses are clear. The mastoids and the middle ear chambers are clear. SOFT TISSUES/SKULL: Hyperostosis of the skull. No acute abnormality of the visualized skull or soft tissues. Vascular calcifications are seen compatible with atherosclerotic disease. Mild central and cortical cerebral atrophy. Mild chronic deep white matter ischemic changes No acute intracranial abnormalities are noted. RECOMMENDATIONS: Unavailable     XR CHEST PORTABLE    Result Date: 12/14/2021  EXAMINATION: ONE XRAY VIEW OF THE CHEST 12/14/2021 8:58 pm COMPARISON: Chest x-ray from 07/16/2010 HISTORY: ORDERING SYSTEM PROVIDED HISTORY: weakness / fatigue TECHNOLOGIST PROVIDED HISTORY: weakness / fatigue Reason for Exam: weakness / fatigue FINDINGS: Overlying ECG monitor leads/snaps. Clips right axilla. Cardiac silhouette WNL in size for AP technique. Mediastinal structures midline with mild elongation thoracic aorta and calcification aortic knob. Elevated right hemidiaphragm. Likely scattered mostly basilar atelectatic changes, especially right medial base. No consolidation or sizable pleural effusion.  DJD spine with mild soft tissue abnormality. A large bilateral thyroid lobe nodular goiter was noted with scattered coarse calcifications on the left. No evidence of pulmonary embolism or acute pulmonary abnormality. A 2 mm uncalcified nodule was noted adjacent to the pericardium in the right middle lobe. A 2 mm uncalcified nodule was also noted in the superior segment of the left lower lobe. If no chest risk factors for malignancy then recommend follow-up CT chest in 1 year. Large nodular goiter with coarse calcifications on the left. Recommend a non emergent thyroid ultrasound as a baseline. Eventration of the right hemidiaphragm. 3 calculi were identified in the right kidney with mild right pyelocaliectasis and mild proximal right ureterectasis. The largest calculus was 8 mm. If the patient has symptoms for right ureteral colic then CT abdomen-pelvis without IV contrast enhancement would be useful to check for right ureteral calculus. RECOMMENDATIONS: Unavailable         Consultations:    Consults:     Final Specialist Recommendations/Findings:   IP CONSULT TO PULMONOLOGY      The patient was seen and examined on day of discharge and this discharge summary is in conjunction with any daily progress note from day of discharge. Discharge plan:     Disposition: Home    Physician Follow Up:     CHARISSE Zamora - NP  [facility] [phone]    In 2 weeks  for hospital discharge    [address] [place] [address]. [address] [place] [address]  [phone]    once patient is home call for oxygen delivery today.        Requiring Further Evaluation/Follow Up POST HOSPITALIZATION/Incidental Findings:    Diet: cardiac diet    Activity: As tolerated    Instructions to Patient:     Discharge Medications:      Medication List      START taking these medications    apixaban 5 MG Tabs tablet  Commonly known as: ELIQUIS  Take 1 tablet by mouth 2 times daily     dexamethasone 6 MG tablet  Commonly known as: DECADRON  Take 1 tablet by mouth daily for 10 days  Start taking on: December 17, 2021     losartan 50 MG tablet  Commonly known as: COZAAR  Take 1 tablet by mouth daily        CONTINUE taking these medications    amLODIPine 5 MG tablet  Commonly known as: NORVASC     bumetanide 0.5 MG tablet  Commonly known as: BUMEX  Take 1 tablet by mouth daily     POTASSIUM CHLORIDE ER PO     vitamin D 50 MCG (2000 UT) Caps capsule        STOP taking these medications    donepezil 10 MG tablet  Commonly known as: ARICEPT     metoprolol succinate 50 MG extended release tablet  Commonly known as: TOPROL XL     VITAMIN D PO           Where to Get Your Medications      These medications were sent to [facility], [address] [phone] - F [phone]  [address] [place] [address]    Phone: [phone]   · losartan 50 MG tablet     You can get these medications from any pharmacy    Bring a paper prescription for each of these medications  · apixaban 5 MG Tabs tablet  · dexamethasone 6 MG tablet         Time Spent on discharge is  35 mins in patient examination, evaluation, counseling as well as medication reconciliation, prescriptions for required medications, discharge plan and follow up. Electronically signed by   Ruben Ramirez MD  12/16/2021  2:50 PM      Thank you CHARISSE Martines - FELICITA for the opportunity to be involved in this patient's care.

## 2021-12-16 NOTE — PROGRESS NOTES
Home Oxygen Evaluation    Room air SpO2 at Rest = 93%    Room air with exercise/exertion = 87%    SpO2 on prescribed O2 level at  LPM  at rest = N/A     with exercise/exertion = 93%    Pt does qualify for home oxygen with exertion prescribed at 2L

## 2021-12-16 NOTE — DISCHARGE INSTR - COC
Continuity of Care Form    Patient Name: Fabio Ibarra   :  1940  MRN:  [id number]    Admit date:  2021  Discharge date:  ***    Code Status Order: Full Code   Advance Directives:      Admitting Physician:  Joshua Marie MD  PCP: CHARISSE Copeland NP    Discharging Nurse: [facility] Unit/Room#:   Discharging Unit Phone Number: ***    Emergency Contact:   Extended Emergency Contact Information  Primary Emergency Contact: [address] Phone: [phone]  Relation: Other  Secondary Emergency Contact: [address] Phone: [phone]  Mobile Phone: [phone]  Relation: Legal Guardian    Past Surgical History:  No past surgical history on file. Immunization History:   Immunization History   Administered Date(s) Administered    Influenza, High Dose (Fluzone 65 yrs and older) 2016    Pneumococcal Conjugate 13-valent ([facility] [place]) 2016    Pneumococcal Polysaccharide ([place]) 2014       Active Problems:  Patient Active Problem List   Diagnosis Code    Pressure injury of buttock, stage 2 ([facility] [address] L89.302    Essential hypertension I10    Hyperlipidemia E78.5    Anxiety F41.9    Depression F32. A    History of breast cancer Z85.3    History of lumpectomy of right breast Z98.890    Venous insufficiency (chronic) (peripheral) I87.2    Pneumonia due to COVID-19 virus U07.1, J12.82       Isolation/Infection:   Isolation            Droplet Plus          Patient Infection Status       Infection Onset Added Last Indicated Last Indicated By Review Planned Expiration Resolved Resolved By    COVID-19 21 COVID-19 & Influenza Combo 21      Resolved    COVID-19 (Rule Out) 21 COVID-19 & Influenza Combo (Ordered)   21 Rule-Out Test Resulted            Nurse Assessment:  Last Vital Signs: BP (!) 93/59   Pulse 66   Temp 99.6 °F (37.6 °C) (Oral)   Resp 25   Ht 4' 9\" (1.448 m)   Wt 141 lb 12.1 oz (64.3 kg)   SpO2 94%   BMI 30.68 kg/m²     Last documented pain score (0-10 scale): Pain Level: 0  Last Weight:   Wt Readings from Last 1 Encounters:   12/15/21 141 lb 12.1 oz (64.3 kg)     Mental Status:  {IP PT MENTAL STATUS:20030}    IV Access:  { CHARLIE IV ACCESS:346959743}    Nursing Mobility/ADLs:  Walking   {CHP DME ZTAB:586099366}  Transfer  {CHP DME FCUX:081015928}  Bathing  {CHP DME VNLY:698611301}  Dressing  {CHP DME ACYS:361648209}  Toileting  {CHP DME RPPY:456867719}  Feeding  {CHP DME KLDA:176145174}  Med Admin  {CHP DME CUXW:036789171}  Med Delivery   { CHARLIE MED Delivery:630742145}    Wound Care Documentation and Therapy:  Wound 12/15/21 Buttocks Left; Medial healing stage 2 (Active)   Wound Etiology Pressure Stage  2 12/15/21 0330   Dressing Status Clean; Dry; Intact 12/16/21 1150   Wound Cleansed Soap and water 12/15/21 0330   Dressing/Treatment Foam 12/16/21 1150   Wound Length (cm) 0.3 cm 12/15/21 0330   Wound Width (cm) 0.3 cm 12/15/21 0330   Wound Surface Area (cm^2) 0.09 cm^2 12/15/21 0330   Wound Assessment Dry; Pink/red 12/15/21 0330   Drainage Amount None 12/15/21 0330   Odor Fecal 12/15/21 0330   [place]-wound Assessment Blanchable erythema 12/16/21 1150   Margins Defined edges 12/15/21 0330   Number of days: 1        Elimination:  Continence: Bowel: {YES / WI:24869}  Bladder: {YES / EA:17124}  Urinary Catheter: {Urinary Catheter:575100483}   Colostomy/Ileostomy/Ileal Conduit: {YES / VR:42124}       Date of Last BM: ***    Intake/Output Summary (Last 24 hours) at 12/16/2021 1449  Last data filed at 12/16/2021 1418  Gross per 24 hour   Intake 540 ml   Output 1100 ml   Net -560 ml     I/O last 3 completed shifts: In: 320 [P.O.:300;  I.V.:20]  Out: 1100 [Urine:1100]    Safety Concerns:     {[facility] Safety Concerns:637200402}    Impairments/Disabilities:      [address] Impairments/Disabilities:203119887}    Nutrition Therapy:  Current Nutrition Therapy:   [address] Diet DJZN:899725422}    Routes of Feeding: {CHP DME Other Feedings:207903060}  Liquids: {Slp liquid thickness:65483}  Daily Fluid Restriction: {CHP DME Yes amt example:203005290}  Last Modified Barium Swallow with Video (Video Swallowing Test): {Done Not Done EGTO:282938386}    Treatments at the Time of Hospital Discharge:   Respiratory Treatments: ***  Oxygen Therapy:  {Therapy; copd oxygen:72673}  Ventilator:    { CC Vent LTGK:688488334}    Rehab Therapies: {THERAPEUTIC INTERVENTION:3386195580}  Weight Bearing Status/Restrictions: { CC Weight Bearin}  Other Medical Equipment (for information only, NOT a DME order):  {EQUIPMENT:423340692}  Other Treatments: ***    Patient's personal belongings (please select all that are sent with patient):  {[facility] DME Belongings:793420810}    RN SIGNATURE:  {Esignature:602187290}    CASE MANAGEMENT/SOCIAL WORK SECTION    Inpatient Status Date: ***    Readmission Risk Assessment Score:  Readmission Risk              Risk of Unplanned Readmission:  12           Discharging to Facility/ Agency   Name:   Address:  Phone:  Fax:    Dialysis Facility (if applicable)   Name:  Address:  Dialysis Schedule:  Phone:  Fax:    / signature: {Esignature:529923255}    PHYSICIAN SECTION    Prognosis: {Prognosis:5679104251}    Condition at Discharge: [address] [place] [address] Patient Condition:239507432}    Rehab Potential (if transferring to Rehab): {Prognosis:6911175084}    Recommended Labs or Other Treatments After Discharge: ***    Physician Certification: I certify the above information and transfer of Liseth Beckman  is necessary for the continuing treatment of the diagnosis listed and that she requires {Admit to Appropriate Level of Care:97888} for {GREATER/LESS:282161542} 30 days.      Update Admission H&P: {CHP DME Changes in KMDWX:414025765}    PHYSICIAN SIGNATURE:  Electronically signed by Shayla Vargas MD on 21 at 2:50 PM EST

## 2021-12-16 NOTE — PROGRESS NOTES
Physical Therapy    Facility/Department: [facility] ICU  Initial Assessment    NAME: Ilene Ayala  : 1940  MRN: [id number]    Date of Service: 2021    Discharge Recommendations:  Patient would benefit from continued therapy after discharge   PT Equipment Recommendations  Equipment Needed: Yes  Mobility Devices: [place]: Rolling    Assessment   Body structures, Functions, Activity limitations: Decreased functional mobility ; Decreased ADL status; Decreased strength; Decreased safe awareness; Decreased cognition; Decreased endurance; Decreased balance; Decreased posture  Assessment: Pt most limited by balance deficits and cognition. Pt will need increased physical assist at this time. Recommend use of RW. Pt refuses despite education. Treatment Diagnosis: Impaired functional mobility 2* COVID 19  Specific instructions for Next Treatment: progress gait (attempt with RW), stairs, HEP  Prognosis: Good; Fair  Decision Making: Medium Complexity  Exam: ROM, MMT, bed mobility, transfers, amb, balance  Clinical Presentation: Pt alert, pleasant, agreeable to PT. Barriers to Learning: alzheimers  REQUIRES PT FOLLOW UP: Yes  Activity Tolerance  Activity Tolerance: Patient limited by cognitive status       Patient Diagnosis(es): The primary encounter diagnosis was 2019 novel coronavirus-infected pneumonia (NCIP). A diagnosis of Pneumonia due to COVID-19 virus was also pertinent to this visit. has a past medical history of Depression, History of total hysterectomy, Hx of breast cancer, and Hypertension. has no past surgical history on file.     Restrictions  Restrictions/Precautions  Restrictions/Precautions: General Precautions, Isolation, Contact Precautions, Fall Risk (Droplet plus +COVID)  Required Braces or Orthoses?: No  Implants present? :  (pt denies)  Position Activity Restriction  Other position/activity restrictions: PT eval and treat  Vision/Hearing  Vision: Impaired  Vision Exceptions: Wears glasses for reading  Hearing: Within functional limits     Subjective  General  Chart Reviewed: Yes  Patient assessed for rehabilitation services?: Yes  Additional Pertinent Hx: HTN, depression, 1:1 sitter  Family / Caregiver Present: No  Referring Practitioner: Lord Arianne MD  Referral Date : 12/15/21  Diagnosis: pneumonia due to covid 23  Follows Commands: Impaired  Subjective  Subjective: Pt in bed, agreeable to PT al. Sitter in room and RN [employer]. Pain Screening  Patient Currently in Pain: Denies  Vital Signs  Patient Currently in Pain: Denies  Oxygen Therapy  SpO2: 92 %  O2 Device: None (Room air)       Orientation  Orientation  Overall Orientation Status: Impaired  Orientation Level: Oriented to person; Disoriented to situation; Disoriented to time; Disoriented to place  Social/Functional History  Social/Functional History  Lives With: Other (comment) (caregiver Bridgetet)  Type of Home: House  Home Layout: Two level, Able to Live on Main level with bedroom/bathroom, Performs ADL's on one level  Home Access:  (unclear on steps )  Bathroom Shower/Tub: Tub/Shower unit  Bathroom Toilet: Standard  Bathroom Equipment: Shower chair  Bathroom Accessibility: Accessible  Home Equipment:  (no DME)  Receives Help From: Other (comment) (caregiver)  ADL Assistance: Needs assistance (IND dressing/toileting, A with bathing)  Homemaking Assistance: Needs assistance (A with meals/laundry by Vilma Narayanan)  Homemaking Responsibilities: No  Ambulation Assistance: Independent  Transfer Assistance: Independent  Active : No  Patient's  Info: caregiver Vilma Normanon  Occupation: Retired  Type of occupation:   Leisure & Hobbies: going to pool at [facility]  IADL Comments: sleeps in regular flat bed. Additional Comments: Pt is questionable historian with unclear home set up due to hx of Alzheimer's. . reports all family is . Pt relies on caregiver Vilma Narayanan.    Cognition        Objective          AROM RLE (degrees)  RLE AROM: WFL  RLE General AROM: pt has difficulty following commands  AROM LLE (degrees)  LLE AROM : WFL  LLE General AROM: pt has difficulty following commands  AROM RUE (degrees)  RUE AROM : WFL  AROM LUE (degrees)  LUE AROM : WFL  Strength RLE  Strength RLE: WFL  Comment: Grossly 3+/5  Strength LLE  Strength LLE: WFL  Comment: Grossly 3+/5  Strength RUE  Strength RUE: WFL  Strength LUE  Strength LUE: WFL     Sensation  Overall Sensation Status: WFL (pt denies)  Bed mobility  Rolling to Left: Contact guard assistance  Supine to Sit: Contact guard assistance  Sit to Supine: Unable to assess  Scooting: Contact guard assistance  Comment: Head of bed slightly elevated, cues for technique. Pt reaches out for assist. No dizziness reported. Pt up in chair with sitter in room end of session. Transfers  Sit to Stand: Contact guard assistance; Minimal Assistance  Stand to sit: Contact guard assistance; Minimal Assistance  Bed to Chair: Contact guard assistance; Minimal assistance  Comment: Min to CGA x1 without device. Pt transfers from bed and toilet. Increased cueing required to follow through. Ambulation  Ambulation?: Yes  Ambulation 1  Surface: level tile  Device: No Device  Assistance: Contact guard assistance; Minimal assistance  Quality of Gait: small steps, slight increased ANITA, 1 LOB with therapist assist, unsteady gait  Gait Deviations: Decreased step length; Increased ANITA; Slow Cher; Decreased step height  Distance: 10', 20'  Comments: Pt refusing to use  RW despite education.  \"This made me fall one time, I;m not using it\"  Stairs/Curb  Stairs?: No     Balance  Posture: Good  Sitting - Static: Good  Sitting - Dynamic: Good  Standing - Static: Fair  Standing - Dynamic: Fair  Comments: Standing balance no device, pt is a fall risk and refusing RW        Plan   Plan  Times per week: 3-4x/week  Specific instructions for Next Treatment: progress gait (attempt with RW), stairs, HEP  Current Treatment Recommendations: Strengthening, Balance Training, Functional Mobility Training, Transfer Training, Endurance Training, Gait Training, Stair training, Equipment Evaluation, Education, & procurement, Patient/Caregiver Education & Training, Safety Education & Training, Home Exercise Program  Safety Devices  Type of devices: All fall risk precautions in place, Call light within reach, Gait belt, Patient at risk for falls, Left in chair, Sitter present, Nurse notified (RADHA Kimble)  Restraints  Initially in place: No    G-Code       OutComes Score                                                  AM-PAC Score  AM-PAC Inpatient Mobility Raw Score : 16 (12/16/21 1332)  AM-PAC Inpatient T-Scale Score : 40.78 (12/16/21 1332)  Mobility Inpatient CMS 0-100% Score: 54.16 (12/16/21 1332)  Mobility Inpatient CMS G-Code Modifier : CK (12/16/21 1332)          Goals  Short term goals  Time Frame for Short term goals: 5 days  Short term goal 1: Pt to demo bed moblity SBA. Short term goal 2: Pt to perform transfers with device, SBA. Short term goal 3: Pt to amb 48' with device, SBA. Short term goal 4: Pt to ascend/descend 2 stairs, CGA with 1 UE support. Short term goal 5: Pt to demo good technique for HEP. Patient Goals   Patient goals :  To go home       Therapy Time   Individual Concurrent Group Co-treatment   Time In 1330         Time Out 1405         Minutes 35         Timed Code Treatment Minutes: 111 [facility], PT

## 2021-12-16 NOTE — TELEPHONE ENCOUNTER
[facility] Transitions Initial Follow Up Call    Outreach made within 2 business days of discharge: Yes    Patient: Ariana Youssef Patient : 1940   MRN: [id number]  Reason for Admission: No discharge information exists for this patient. Discharge Date:  2021       Spoke with: Ariadna Elliott    Discharge department/facility: [address] [place] [address] Interactive Patient Contact:  Was patient able to fill all prescriptions: Yes  Was patient instructed to bring all medications to the follow-up visit: Yes  Is patient taking all medications as directed in the discharge summary? Yes  Does patient understand their discharge instructions: Yes  Does patient have questions or concerns that need addressed prior to 7-14 day follow up office visit: yes - caregiver is concerned that the patient was put on eliquis and the elderly should not be taking these types of medications. Patient hasn't been [address] yet, but will be this afternoon. She was also concerned about some of the testing that was done while she was there and I explained that DWAYNE Franco will go over anything that needs to be discusssed at the appointment. Scheduled appointment with PCP within 7-14 days    Follow Up  No future appointments.     Miguel Saha MA

## 2021-12-16 NOTE — PROGRESS NOTES
Caregiver, Christian Gibbs, updated by Dovie Opitz. Questions answered. Christian Gibbs expressed concern over pt status, requesting she possibly be discharged to her care tomorrow. Writer informed Christian Gibbs again of Dr. Timothy Dumont' progress note stating her status will be re-evaluated tomorrow. Christian Gibbs expresses concern regarding \"Dr. Timothy Dumont had not called me back again today. \" Writer educated her on physician updates and will only occur once during the day. Christian Gibbs proceeds to ask why the pt was not out of bed for the day. Writer informed Christian Gibbs that PT was consulted, and given pt's increased safety risk as well as multiple other scenarios that took acute priority, assured her pt would be evaluated by PT tomorrow. Christian Gibbs states understanding.

## 2021-12-16 NOTE — PROGRESS NOTES
ICU Progress Note (Non-Vent)  [facility]    Patient - Subha Tiwari,  Age - 80 y.o.    - 1940      Room Number -    MRN -  [id number]   Acct # - [de-identified]  Date of Admission -  2021  8:09 PM    Events of Past 24 Hours   She looks very comfortable, saturating around 94% on room air but does require oxygen with exertion. Vitals    height is 4' 9\" (1.448 m) and weight is 141 lb 12.1 oz (64.3 kg). Her oral temperature is 99.7 °F (37.6 °C). Her blood pressure is 126/53 (abnormal) and her pulse is 68. Her respiration is 17 and oxygen saturation is 95%.        Temperature Range: Temp: 99.7 °F (37.6 °C) Temp  Av.3 °F (36.8 °C)  Min: 97.4 °F (36.3 °C)  Max: 99.7 °F (37.6 °C)  BP Range:  Systolic (52EHQ), JYO:676 , Min:97 , QQQ:542     Diastolic (05XSK), GWE:50, Min:34, Max:58    Pulse Range: Pulse  Av.7  Min: 42  Max: 73  Respiration Range: Resp  Av.3  Min: 11  Max: 21  Current Pulse Ox[de-identified]  SpO2: 95 %  24HR Pulse Ox Range:  SpO2  Av.4 %  Min: 92 %  Max: 98 %  Oxygen Amount and Delivery: O2 Flow Rate (L/min): 0 L/min    Wt Readings from Last 3 Encounters:   12/15/21 141 lb 12.1 oz (64.3 kg)   07/15/21 160 lb (72.6 kg)   21 160 lb (72.6 kg)     I/O       Intake/Output Summary (Last 24 hours) at 2021 1117  Last data filed at 2021 1025  Gross per 24 hour   Intake 360 ml   Output 1100 ml   Net -740 ml     DRAIN/TUBE OUTPUT       Invasive Lines   ICP PRESSURE RANGE  No data recorded  CVP PRESSURE RANGE  No data recorded      Medications      amLODIPine  5 mg Oral Daily    bumetanide  0.5 mg Oral Daily    Vitamin D  2,000 Units Oral Daily    [Held by provider] metoprolol succinate  25 mg Oral Daily    potassium chloride  10 mEq Oral Daily    sodium chloride flush  5-40 mL IntraVENous 2 times per day    apixaban  5 mg Oral BID    dexamethasone  6 mg Oral Daily     sodium chloride flush, sodium chloride, ondansetron **OR** ondansetron, magnesium hydroxide, acetaminophen **OR** acetaminophen, sodium chloride flush  IV Drips/Infusions   sodium chloride         Diet/Nutrition   ADULT DIET; Regular    Exam      Constitutional - Alert, arousable  General Appearance  well developed, well nourished  HEENT -normocephalic, atraumatic. PERRLA  Lungs - Chest expands equally, no wheezes, rales or rhonchi. Cardiovascular - Heart sounds are normal.  normal rate and rhythm regular, no murmur, gallop or rub. Abdomen - soft, nontender, nondistended, no masses or organomegaly  Neurologic - CN II-XII are grossly intact. There are no focal motor deficits  Skin - no bruising or bleeding  Extremities - no cyanosis, clubbing or edema    Lab Results   CBC     Lab Results   Component Value Date    WBC 10.2 12/16/2021    RBC 4.18 12/16/2021    HGB 12.8 12/16/2021    HCT 37.8 12/16/2021     12/16/2021    MCV 90.6 12/16/2021    MCH 30.6 12/16/2021    MCHC 33.8 12/16/2021    RDW 14.0 12/16/2021    LYMPHOPCT 3 12/14/2021    MONOPCT 6 12/14/2021    BASOPCT 0 12/14/2021    MONOSABS 0.56 12/14/2021    LYMPHSABS 0.28 12/14/2021    EOSABS 0.00 12/14/2021    BASOSABS 0.00 12/14/2021    DIFFTYPE NOT REPORTED 12/14/2021       BMP   Lab Results   Component Value Date     12/16/2021    K 3.7 12/16/2021     12/16/2021    CO2 26 12/16/2021    BUN 19 12/16/2021    CREATININE 1.11 12/16/2021    GLUCOSE 104 12/16/2021       LFTS  Lab Results   Component Value Date    ALKPHOS 86 12/14/2021    ALT 9 12/14/2021    AST 15 12/14/2021    PROT 6.7 12/14/2021    BILITOT 0.32 12/14/2021    LABALBU 4.3 12/14/2021       ABG ABGs: No results found for: PHART, PO2ART, OSF2VSZ    Lab Results   Component Value Date    MODE NOT REPORTED 12/14/2021         INR  No results for input(s): PROTIME, INR in the last 72 hours. APTT  No results for input(s): APTT in the last 72 hours.     Lactic Acid  Lab Results   Component Value Date    LACTA 1.7 12/14/2021        BNP   No results for input(s): BNP in the last 72 hours.      Cultures       Radiology     CXR      CT Scans    Tiny 2 mm pulmonary nodules (right middle lobe and left lower lobe)  Multinodular goiter  Infiltrate seen in lingula (faint)  No pulmonary embolism          SYSTEMS ASSESSMENT    COVID-19 infection, tested +12/14  Elevated D-dimer  History of hypertension  History of anxiety  History of early Alzheimer  Not vaccinated for Covid  Multinodular goiter  Pulmonary nodules    She appears to be stable for discharge, her D-dimer is significantly gone down  She will go home on oxygen therapy as well as Decadron  Is to isolate for 9 more days  She needs further investigation of her multinodular goiter  She will need a follow-up CT of the chest in 1 year  I spoke to her caregiver Yara Nugent about these issues  We will send her home on 1 month of [facility]      Critical Care Time   0 min    Electronically signed by Jhonny Bundy MD on 12/16/2021 at 11:17 AM

## 2021-12-17 ENCOUNTER — CARE COORDINATION (OUTPATIENT)
Dept: CASE MANAGEMENT | Age: 81
End: 2021-12-17

## 2021-12-17 DIAGNOSIS — U07.1 PNEUMONIA DUE TO COVID-19 VIRUS: Primary | ICD-10-CM

## 2021-12-17 DIAGNOSIS — J12.82 PNEUMONIA DUE TO COVID-19 VIRUS: Primary | ICD-10-CM

## 2021-12-17 PROCEDURE — 1111F DSCHRG MED/CURRENT MED MERGE: CPT | Performed by: NURSE PRACTITIONER

## 2021-12-17 NOTE — CARE COORDINATION
[facility] Transitions Follow Up Call    2021    Patient: Charlsie Primrose    Patient : 1940   MRN: [id number]    Reason for Admission: COVID PNE  Discharge Date: 21   RARS: Readmission Risk Score: 10.2 ( )       Spoke with: patient's granddaughter & caregiver, Corey Mock. Reports patient is doing pretty well - some weakness & fatigue, but patient still wanted to stick with her routine & wanted to go swimming at [facility] today. Corey Mock takes her to the  for swimming a few times weekly. Patient has Alzheimers & likes her routine, but was explained to her that she can't return to swimming until after quarantine & given the OK by PCP. Patient's daughter is an ICU nurse at [employer]. Corey Mock is obtaining eliquis with one time coupon - no cost to patient - will be on for 30 days - elevated D-Dimer. Nonproductive cough & fatigue. Some \"stomach\" upset. Otherwise denies other covid symptoms - no SOB, afebrile, eating & drinking without issues. Up & about in home. Informed granddaughter of care transition & CTN contact # & covid HD hotline. VV with provider on 21      Care Transitions Subsequent and Final Call    Schedule Follow Up Appointment with PCP: Completed  Subsequent and Final Calls  Do you have any ongoing symptoms?: Yes  Onset of Patient-reported symptoms: In the past 7 days  Patient-reported symptoms: Fatigue, Weakness  Have your medications changed?: Yes  Do you have any questions related to your medications?: No  Do you currently have any active services?: No  Do you have any needs or concerns that I can assist you with?: No  Care Transitions Interventions  Other Interventions:         Transitions of Care Initial Call    Was this an external facility discharge?  No     Challenges to be reviewed by the provider   Additional needs identified to be addressed with provider: No  none             Method of communication with provider : none      Advance Care Planning:   Does patient have an Advance Directive: reviewed and current. Was this a readmission? No  Patient stated reason for admission: covid pne  Patients top risk factors for readmission: functional cognitive ability  medical condition-covid pne    Care Transition Nurse (CTN) contacted the caregiver  GRANDDAUGHTERToshia by telephone to perform post hospital discharge assessment. Verified name and  with caregiver as identifiers. Provided introduction to self, and explanation of the CTN role. CTN reviewed discharge instructions, medical action plan and red flags with caregiver who verbalized understanding. Caregiver given an opportunity to ask questions and does not have any further questions or concerns at this time. Were discharge instructions available to patient? Yes. Reviewed appropriate site of care based on symptoms and resources available to patient including: PCP and CTN. The family agrees to contact the PCP office for questions related to their healthcare. Medication reconciliation was performed with caregiver, who verbalizes understanding of administration of home medications. Covid Risk Education     Educated patient about risk for severe COVID-19 due to risk factors according to CDC guidelines. CTN reviewed discharge instructions, medical action plan and red flag symptoms with the caregiver who verbalized understanding. Discussed COVID vaccination status: Yes. NOT VACCINATED Education provided on COVID-19 vaccination as appropriate. Discussed exposure protocols and quarantine with CDC Guidelines. Caregiver was given an opportunity to verbalize any questions and concerns and agrees to contact CTN or health care provider for questions related to their healthcare. Reviewed and educated caregiver on any new and changed medications related to discharge diagnosis. Was patient discharged with a pulse oximeter?  No -has pulse ox Discussed and confirmed pulse oximeter discharge instructions and when to notify provider or seek emergency care. CTN provided contact information. Plan for follow-up call in 5-7 days based on severity of symptoms and risk factors.   Plan for next call: symptom management-reassess  follow up appointment-review    Follow Up  Future Appointments   Date Time Provider Rhina Waller   12/20/2021 11:00 AM CHARISSE Castellano - NP 1 Jamilah Puente RN

## 2021-12-20 ENCOUNTER — VIRTUAL VISIT (OUTPATIENT)
Dept: FAMILY MEDICINE CLINIC | Age: 81
End: 2021-12-20
Payer: MEDICARE

## 2021-12-20 DIAGNOSIS — U07.1 PNEUMONIA DUE TO COVID-19 VIRUS: Primary | ICD-10-CM

## 2021-12-20 DIAGNOSIS — J12.82 PNEUMONIA DUE TO COVID-19 VIRUS: Primary | ICD-10-CM

## 2021-12-20 PROCEDURE — 1111F DSCHRG MED/CURRENT MED MERGE: CPT | Performed by: NURSE PRACTITIONER

## 2021-12-20 PROCEDURE — 99214 OFFICE O/P EST MOD 30 MIN: CPT | Performed by: NURSE PRACTITIONER

## 2021-12-20 ASSESSMENT — ENCOUNTER SYMPTOMS
BACK PAIN: 0
RHINORRHEA: 0
NAUSEA: 1
DIARRHEA: 0
CHEST TIGHTNESS: 0
ABDOMINAL DISTENTION: 0
COUGH: 0
ABDOMINAL PAIN: 1
CONSTIPATION: 0
SHORTNESS OF BREATH: 0
SORE THROAT: 0
VOMITING: 0

## 2021-12-20 NOTE — PROGRESS NOTES
[place] APRN-[facility]  [address] [facility]IC[facility]  [address] [facility]  [address] [address]  Dept: [phone]  Dept Fax: [phone]      Post-Discharge Transitional Care Management Services or Hospital Follow Up via virtual encounter. Veronica Lund   YOB: 1940    Date of Office Visit:  12/20/2021  Date of Hospital Admission: 12/14/21  Date of Hospital Discharge: 12/16/21  Risk of hospital readmission (high >=14%. Medium >=10%) :Readmission Risk Score: 10.2 ( )      Care management risk score Rising risk (score 2-5) and Complex Care (Scores >=6): 0     Non face to face  following discharge, date last encounter closed (first attempt may have been earlier): 12/16/2021  2:58 PM    Call initiated 2 business days of discharge: Yes    Patient Active Problem List   Diagnosis    Pressure injury of buttock, stage 2 ([facility] [address]    Essential hypertension    Hyperlipidemia    Anxiety    Depression    History of breast cancer    History of lumpectomy of right breast    Venous insufficiency (chronic) (peripheral)    Pneumonia due to COVID-19 virus       Allergies   Allergen Reactions    Aspirin Other (See Comments)    Other Other (See Comments)    Sulfa Antibiotics        Medications listed as ordered at the time of discharge from hospital     Medication List          Accurate as of December 20, 2021 11:33 AM. If you have any questions, ask your nurse or doctor.             CONTINUE taking these medications    amLODIPine 5 MG tablet  Commonly known as: NORVASC     apixaban 5 MG Tabs tablet  Commonly known as: ELIQUIS  Take 1 tablet by mouth 2 times daily     bumetanide 0.5 MG tablet  Commonly known as: BUMEX  Take 1 tablet by mouth daily     dexamethasone 6 MG tablet  Commonly known as: DECADRON  Take 1 tablet by mouth daily for 10 days     losartan 50 MG tablet  Commonly known as: COZAAR  Take 1 tablet by mouth daily     POTASSIUM CHLORIDE ER PO initiated in ED. She was discharged home on 12/16 with the po decadron. Since being home, her oxygen level has been 96-99% on room air. She has been eating and drinking better. There are no complaints by her caregiver, Aris lua. Inpatient course: Discharge summary reviewed- see chart. Interval history/Current status: See HPI    Review of Systems   Constitutional: Positive for fatigue (improving). Negative for activity change and fever. HENT: Negative for congestion, ear pain, rhinorrhea and sore throat. Respiratory: Negative for cough, chest tightness and shortness of breath. Cardiovascular: Negative for chest pain and palpitations. Gastrointestinal: Positive for abdominal pain (improved) and nausea (improved). Negative for abdominal distention, constipation, diarrhea and vomiting. Endocrine: Negative for polydipsia, polyphagia and polyuria. Genitourinary: Negative for difficulty urinating and dysuria. Musculoskeletal: Negative for arthralgias, back pain and myalgias. Skin: Negative for rash. Neurological: Negative for dizziness, weakness, light-headedness and headaches. Hematological: Negative for adenopathy. Psychiatric/Behavioral: Negative for agitation and behavioral problems. The patient is not nervous/anxious. There are no vitals documented for this encounter, as this is a virtual visit. Physical Exam  Vitals reviewed: Vital signs unavailable, as this is a virtual visit. Constitutional:       General: She is not in acute distress. Appearance: Normal appearance. She is not ill-appearing or toxic-appearing. Pulmonary:      Effort: No tachypnea or accessory muscle usage. Comments: Patient able to talk in full sentences without difficulty   Neurological:      General: No focal deficit present. Mental Status: She is alert and oriented to person, place, and time.    Psychiatric:         Mood and Affect: Mood normal.         Speech: Speech normal. Behavior: Behavior normal. Behavior is cooperative. There were no vitals filed for this visit. There is no height or weight on file to calculate BMI. Wt Readings from Last 3 Encounters:   12/15/21 141 lb 12.1 oz (64.3 kg)   07/15/21 160 lb (72.6 kg)   06/29/21 160 lb (72.6 kg)     BP Readings from Last 3 Encounters:   12/16/21 (!) 96/58   07/15/21 110/70   06/29/21 116/78        Assessment/Plan:  1. Pneumonia due to COVID-19 virus  - Improving  - Will cont with current treatment as pt is currently stable on current treatment  - Continue with all medications as previously prescribed.   - Will cont to follow with all specialists as previously as instructed     - Will follow up on CT chest in 1 year to evaluate lung nodules  - WI DISCHARGE MEDS RECONCILED W/ CURRENT OUTPATIENT MED LIST      Medical Decision Making: CHARISSE Perez-CNP

## 2021-12-23 ENCOUNTER — TELEPHONE (OUTPATIENT)
Dept: FAMILY MEDICINE CLINIC | Age: 81
End: 2021-12-23

## 2021-12-23 RX ORDER — BENZONATATE 100 MG/1
100 CAPSULE ORAL 3 TIMES DAILY PRN
Qty: 40 CAPSULE | Refills: 0 | Status: SHIPPED | OUTPATIENT
Start: 2021-12-23 | End: 2021-12-30

## 2021-12-23 NOTE — TELEPHONE ENCOUNTER
Can we call her? Sometimes the cough can linger for weeks after diagnosis. If she would like, I can send over some tessalon pearles.

## 2021-12-23 NOTE — TELEPHONE ENCOUNTER
Clojewelle Riddles called stating Carolina's cough is not getting any better, she is in horrible pain from the coughing, the coughing is non-stop, and Hamida Smith has not been able to get a lot of rest/sleep due to the coughing. Cloyde Riddles states they can't seem to get the coughing under control. Shelbye Riddles also states Hamida Smith doesn't have a fever and that her pulse oxygen levels are running from 96-99. Please Advise.

## 2021-12-30 ENCOUNTER — CARE COORDINATION (OUTPATIENT)
Dept: CASE MANAGEMENT | Age: 81
End: 2021-12-30

## 2022-01-04 NOTE — CARE COORDINATION
[facility] Transitions Follow Up Call    2021  Patient: Abhijit Archuleta    Patient : 1940   MRN: [id number]    Reason for Admission: COVID PNE  Discharge Date: 21   RARS: Readmission Risk Score: 10.2 ( )         Spoke with: patient's granddaughter, Wendie Valdez who returned call to CTN    Reports patient still has some fatigue/tired, but getting back to her normal routine. No longer using oxygen - oxygen sat stays within normal limits. Patient's daughter is RN at [facility] who calls covid patients who discharge from there, so patient is under watchful eye of daughter as well. Care Transition will continue to follow. No new complaints or issues. Care Transitions Subsequent and Final Call    Schedule Follow Up Appointment with PCP: Completed  Subsequent and Final Calls  Do you have any ongoing symptoms?: Yes  Patient-reported symptoms: Cough, Fatigue  Interventions for patient-reported symptoms: Other  Have your medications changed?: Yes  Do you have any questions related to your medications?: No  Do you have any needs or concerns that I can assist you with?: No  Care Transitions Interventions  Other Interventions:           Care Transitions Follow Up Call    Needs to be reviewed by the provider   Additional needs identified to be addressed with provider: No  none             Method of communication with provider : none      Care Transition Nurse (CTN) contacted the family by telephone to follow up after admission . Verified name and  with family as identifiers. Addressed changes since last contact: medications-completed decadron  Discussed follow-up appointments. If no appointment was previously scheduled, appointment scheduling offered: Yes. Is follow up appointment scheduled within 7 days of discharge?  Yes.       Patients top risk factors for readmission: medical condition-covid pne  Interventions to address risk factors: Scheduled appointment with PCP- and Obtained and reviewed discharge summary and/or continuity of care documents    CTN provided contact information for future needs. Plan for follow-up call in 7-10 days based on severity of symptoms and risk factors.   Plan for next call: symptom management-reassess  follow up appointment-review    Rajan Cárdenas RN

## 2022-02-02 ENCOUNTER — TELEPHONE (OUTPATIENT)
Dept: FAMILY MEDICINE CLINIC | Age: 82
End: 2022-02-02

## 2022-02-02 NOTE — TELEPHONE ENCOUNTER
----- Message from [address] sent at 2/2/2022  1:42 PM EST -----  Subject: Message to Provider    QUESTIONS  Information for Provider? Lars Albert called in on the behalf of her   patient Sona Tilley and states that she was prescribed Donepezil 10mg and   Wendie Valdez states that this medication had some side effects on Chan Greenberg that she   would like to talk abut changing her medication but keep it at 5 mg states   that Chan Greenberg seems to be spaced out more often and that's usually not her and   that she had also been dealing with insomnia please advise pt and   caregiver of this possible medication change request   ---------------------------------------------------------------------------  --------------  CALL BACK INFO  What is the best way for the office to contact you? OK to leave message on   voicemail  Preferred Call Back Phone Number? [phone]  ---------------------------------------------------------------------------  --------------  SCRIPT ANSWERS  Relationship to Patient? Third Party  Representative Name?  Wendie Valdez

## 2022-02-07 ENCOUNTER — TELEPHONE (OUTPATIENT)
Dept: FAMILY MEDICINE CLINIC | Age: 82
End: 2022-02-07

## 2022-02-07 NOTE — TELEPHONE ENCOUNTER
Niko Cure states the pt's legs are swollen. Niko Cure states this started last night and they are still swollen today. Niko Cure states the pt's legs are elevated and they are going to wrap the pt's legs in ace bandages. Niko Cure states they are unable to get the pt's shoes on. Niko Tam is wondering if the pt's medication could be upped to help this or if there is something that can be done.      Please Advise

## 2022-03-07 ENCOUNTER — TELEPHONE (OUTPATIENT)
Dept: FAMILY MEDICINE CLINIC | Age: 82
End: 2022-03-07

## 2022-03-07 NOTE — TELEPHONE ENCOUNTER
Spoke with [employer] She stated pt has been taking 5 mg of melatonin nightly for a while.   I spoke with javi and she stated that pt could try 10 mg.  [employer] was informed and expressed understanding

## 2022-03-07 NOTE — TELEPHONE ENCOUNTER
----- Message from [facility] sent at 3/7/2022  8:59 AM EST -----  Subject: Message to Provider    QUESTIONS  Information for Provider? [facility] her care taker was calling in saying   patient is not sleeping through the night and is asking for medication to   help patient get sleep please call back and assist or prescribe   ---------------------------------------------------------------------------  --------------  CALL BACK INFO  What is the best way for the office to contact you? OK to leave message on   voicemail  Preferred Call Back Phone Number? [phone]  ---------------------------------------------------------------------------  --------------  SCRIPT ANSWERS  Relationship to Patient?  Third Party  Representative Name? Reina Osorio

## 2022-03-08 ENCOUNTER — TELEPHONE (OUTPATIENT)
Dept: FAMILY MEDICINE CLINIC | Age: 82
End: 2022-03-08

## 2022-03-08 RX ORDER — CIPROFLOXACIN 500 MG/1
500 TABLET, FILM COATED ORAL 2 TIMES DAILY
Qty: 14 TABLET | Refills: 0 | Status: SHIPPED | OUTPATIENT
Start: 2022-03-08 | End: 2022-03-15

## 2022-03-08 NOTE — TELEPHONE ENCOUNTER
Patient Sesarye Davisn called states she has a strong, smell, burning while urinating thinks it may be a uti wants something called into the Pharamacy.

## 2022-03-13 DIAGNOSIS — I10 ESSENTIAL HYPERTENSION: ICD-10-CM

## 2022-03-14 RX ORDER — BUMETANIDE 0.5 MG/1
TABLET ORAL
Qty: 90 TABLET | Refills: 1 | Status: SHIPPED | OUTPATIENT
Start: 2022-03-14 | End: 2022-05-06 | Stop reason: ALTCHOICE

## 2022-03-14 RX ORDER — METOPROLOL SUCCINATE 100 MG/1
TABLET, EXTENDED RELEASE ORAL
Qty: 90 TABLET | Refills: 1 | OUTPATIENT
Start: 2022-03-14

## 2022-03-16 DIAGNOSIS — I10 ESSENTIAL HYPERTENSION: ICD-10-CM

## 2022-03-16 RX ORDER — METOPROLOL SUCCINATE 100 MG/1
TABLET, EXTENDED RELEASE ORAL
Qty: 90 TABLET | Refills: 1 | OUTPATIENT
Start: 2022-03-16

## 2022-04-11 ENCOUNTER — TELEMEDICINE (OUTPATIENT)
Dept: FAMILY MEDICINE CLINIC | Age: 82
End: 2022-04-11
Payer: MEDICARE

## 2022-04-11 DIAGNOSIS — F32.A DEPRESSION, UNSPECIFIED DEPRESSION TYPE: ICD-10-CM

## 2022-04-11 DIAGNOSIS — F41.9 ANXIETY: ICD-10-CM

## 2022-04-11 DIAGNOSIS — E78.5 HYPERLIPIDEMIA, UNSPECIFIED HYPERLIPIDEMIA TYPE: ICD-10-CM

## 2022-04-11 DIAGNOSIS — F03.91 DEMENTIA WITH BEHAVIORAL DISTURBANCE, UNSPECIFIED DEMENTIA TYPE: ICD-10-CM

## 2022-04-11 DIAGNOSIS — L89.302 PRESSURE INJURY OF BUTTOCK, STAGE 2, UNSPECIFIED LATERALITY (HCC): ICD-10-CM

## 2022-04-11 DIAGNOSIS — I10 ESSENTIAL HYPERTENSION: Primary | ICD-10-CM

## 2022-04-11 PROBLEM — U07.1 PNEUMONIA DUE TO COVID-19 VIRUS: Status: RESOLVED | Noted: 2021-12-14 | Resolved: 2022-04-11

## 2022-04-11 PROBLEM — J12.82 PNEUMONIA DUE TO COVID-19 VIRUS: Status: RESOLVED | Noted: 2021-12-14 | Resolved: 2022-04-11

## 2022-04-11 PROCEDURE — 99214 OFFICE O/P EST MOD 30 MIN: CPT | Performed by: NURSE PRACTITIONER

## 2022-04-11 RX ORDER — DONEPEZIL HYDROCHLORIDE 5 MG/1
TABLET, FILM COATED ORAL
COMMUNITY
Start: 2022-03-16

## 2022-04-11 RX ORDER — MEMANTINE HYDROCHLORIDE 5 MG/1
5 TABLET ORAL 2 TIMES DAILY
Qty: 180 TABLET | Refills: 1 | Status: SHIPPED | OUTPATIENT
Start: 2022-04-11 | End: 2022-10-11

## 2022-04-11 ASSESSMENT — ENCOUNTER SYMPTOMS
SORE THROAT: 0
ABDOMINAL PAIN: 0
RHINORRHEA: 0
VOMITING: 0
CONSTIPATION: 0
BACK PAIN: 0
COUGH: 0
DIARRHEA: 0
CHEST TIGHTNESS: 0
SHORTNESS OF BREATH: 0
ABDOMINAL DISTENTION: 0
NAUSEA: 0

## 2022-04-11 NOTE — PROGRESS NOTES
Lizet Colin, APRN-CNP  [facility]  [id number] [address]  [address] [address]  Dept: [phone]  Dept Fax: [phone]     PATIENT ID: Lesley Trevino is a 80 y.o. female. HPI:  Established pt, presenting via virtual visit for f/u on chronic medical problems; HTN, HLD, anxiety, depression, dementia, go over labs and/or diagnostic studies, and medication refills. Pt denies any fever or chills. Pt today denies any HA, chest pain, or SOB. Pt denies any N/V/D/C or abdominal pain. Today, patient is doing okay but there is still some concern with her memory. We did attempt to increase her Aricept but she was not able to tolerate the higher dose. It was then decreased back down to the 5 mg dosing. She is now with 24 hour supervision but remains at home. Her caregiver, Romie Kovacs, reports worsening restlessness at night. They have tried melatonin, which she reports a reaction too. They will be trying lavender and oatmeal baths in the evenings to try to relax her. My previous office notes, labs and diagnostic studies were reviewed prior to and during encounter. The patient's past medical, surgical, social, and family history as well as current medications and allergies were reviewed as documented in today's encounter by LUCI Melendez. Current Outpatient Medications on File Prior to Visit   Medication Sig Dispense Refill    donepezil (ARICEPT) 5 MG tablet TAKE 1 TABLET BY MOUTH EVERY DAY AT NIGHT      bumetanide (BUMEX) 0.5 MG tablet TAKE 1 TABLET BY MOUTH EVERY DAY 90 tablet 1    losartan (COZAAR) 50 MG tablet Take 1 tablet by mouth daily 30 tablet 3    amLODIPine (NORVASC) 5 MG tablet Take 5 mg by mouth daily      POTASSIUM CHLORIDE ER PO Take 10 mEq by mouth daily      Cholecalciferol (VITAMIN D) 50 MCG (2000 UT) CAPS capsule Take 1 capsule by mouth daily       No current facility-administered medications on file prior to visit. SUBJECTIVE:     Review of Systems   Constitutional: Negative for activity change, fatigue and fever. HENT: Negative for congestion, ear pain, rhinorrhea and sore throat. Eyes: Positive for visual disturbance (wears glasses). Respiratory: Negative for cough, chest tightness and shortness of breath. Cardiovascular: Negative for chest pain and palpitations. Gastrointestinal: Negative for abdominal distention, abdominal pain, constipation, diarrhea, nausea and vomiting. Endocrine: Negative for polydipsia, polyphagia and polyuria. Genitourinary: Negative for difficulty urinating and dysuria. Musculoskeletal: Negative for arthralgias, back pain and myalgias. Skin: Negative for rash. Neurological: Negative for dizziness, weakness, light-headedness and headaches. Hematological: Negative for adenopathy. Psychiatric/Behavioral: Positive for agitation (more restless at night) and confusion (stable). Negative for behavioral problems. The patient is nervous/anxious. OBJECTIVE:  There were no vitals taken for this visit. Physical Exam  Vitals reviewed: Vital signs unavailable, as this is a virtual visit. Constitutional:       General: She is not in acute distress. Appearance: Normal appearance. She is not ill-appearing or toxic-appearing. Pulmonary:      Effort: No tachypnea or accessory muscle usage. Comments: Patient able to talk in full sentences without difficulty   Neurological:      General: No focal deficit present. Mental Status: She is alert and oriented to person, place, and time. Psychiatric:         Mood and Affect: Mood normal. Affect is labile and flat. Speech: Speech normal.         Behavior: Behavior normal. Behavior is cooperative. Cognition and Memory: Cognition is impaired. Memory is impaired. She exhibits impaired recent memory and impaired remote memory. ASSESSMENT:   Diagnosis Orders   1. Essential hypertension     2. Hyperlipidemia, unspecified hyperlipidemia type     3. Anxiety     4. Depression, unspecified depression type     5. History of breast cancer     6. History of lumpectomy of right breast     7. Venous insufficiency (chronic) (peripheral)     8. Dementia with behavioral disturbance, unspecified dementia type ([facility]       PLAN:  1. Essential hypertension  - Stable: Medication re-filled as needed, con't medications as prescribed, con't current tx plan  - Continue Losartan 50 mg daily as previously prescribed. - Continue with Amlodipine 5 mg daily as previously prescribed. 2. Hyperlipidemia, unspecified hyperlipidemia type  - Stable: Medication re-filled as needed, con't medications as prescribed, con't current tx plan  - Will cont with low fat/chol diet and exercise as ordered. 3. Anxiety  4. Depression, unspecified depression type  5. Dementia with behavioral disturbance, unspecified dementia type ([facility]  - Stable: Medication re-filled as needed, con't medications as prescribed, con't current tx plan  - CT 12/2021 with central and cortical cerebral atrophy; chronic ischemic changes  - Currently taking Aricept 5 mg  - Will add Namenda 5 mg BID  - Pt encouraged to deep breath and relax through anxious moments   - Offered reassurance and allowed to ventilate feelings and ask questions.   - Non-pharmological coping methods discussed. 6. Pressure injury of buttock, stage 2, unspecified laterality ([facility]  - Improved  - Will [facility] as instructed      - Rest of systems unchanged, continue current treatments. - On this date April 11, 2022,  I have spent greater than 50% of this visit reviewing previous notes, test results and/or face to face with the patient discussing the diagnoses, importance of compliance with the treatment plan, counseling, coordinating care as well as documenting on the day of the visit.      Iam Tyler, was evaluated through a synchronous (real-time) audio-video encounter. The patient (or guardian if applicable) is aware that this is a billable service, which includes applicable co-pays. This Virtual Visit was conducted with patient's (and/or legal guardian's) consent. The visit was conducted pursuant to the emergency declaration under the [address] [place] [address], [address] [place] [address] authority and the [employer] Act. Patient identification was verified, and a caregiver was present when appropriate. The patient was located at home in a state where the provider was licensed to provide care. Total time spent for this encounter: Not billed by time    --CHARISSE Pereira NP on 4/11/2022 at 10:16 AM    An electronic signature was used to authenticate this note.        CHARISSE Fowler-CNP

## 2022-04-14 ENCOUNTER — TELEPHONE (OUTPATIENT)
Dept: FAMILY MEDICINE CLINIC | Age: 82
End: 2022-04-14

## 2022-04-14 NOTE — TELEPHONE ENCOUNTER
Patients personal caregiver has been noticing that the patient has had a odor coming from the vaginal area. Pt does bathe everyday, and swims every other day. Caregiver is stating that patient is not complaining of any pain or irration but the smell is noticeable. Pt's last physician told pt she does not have to go to the OBGYN since she is almost [de-identified]. Patient is curious if that's still the case or if she should still be getting regular PAPS. Please advise.

## 2022-04-14 NOTE — TELEPHONE ENCOUNTER
I would have her try some over the counter vaginal cream for yeast infection and to make sure to keep that area clean and dry. I would also recommend some Gold Bond powder to help with that. As far as having annual GYN exams, she does not need them unless there is an indication moving forward.

## 2022-04-29 ENCOUNTER — TELEPHONE (OUTPATIENT)
Dept: FAMILY MEDICINE CLINIC | Age: 82
End: 2022-04-29

## 2022-04-29 RX ORDER — CIPROFLOXACIN 500 MG/1
500 TABLET, FILM COATED ORAL 2 TIMES DAILY
Qty: 14 TABLET | Refills: 0 | Status: SHIPPED | OUTPATIENT
Start: 2022-04-29 | End: 2022-07-14 | Stop reason: SDUPTHER

## 2022-04-29 NOTE — TELEPHONE ENCOUNTER
----- Message from Calin nSyder sent at 4/29/2022  1:42 PM EDT -----  Subject: Message to Provider    QUESTIONS  Information for Provider? Patient care giver called 2 weeks ago about the   patients UTI. She was told to use Monistat but it didn't work. She is   still having trouble and needing an antibiotic sent in . please advise   ---------------------------------------------------------------------------  --------------  CALL BACK INFO  What is the best way for the office to contact you? OK to leave message on   voicemail  Preferred Call Back Phone Number? [phone]  ---------------------------------------------------------------------------  --------------  SCRIPT ANSWERS  Relationship to Patient? Third Party  Third Party Type?  Other  Other Third Party Type? care giver  Representative Name? Bogdan Pyle

## 2022-05-05 ASSESSMENT — ENCOUNTER SYMPTOMS
SORE THROAT: 0
ABDOMINAL DISTENTION: 0
CONSTIPATION: 0
BACK PAIN: 0
CHEST TIGHTNESS: 0
DIARRHEA: 0
VOMITING: 0
SHORTNESS OF BREATH: 0
NAUSEA: 0
RHINORRHEA: 0
ABDOMINAL PAIN: 0
COUGH: 0

## 2022-05-05 NOTE — PROGRESS NOTES
Sivan Shrestha, APRN-CNP  [facility]  [id number] [address]  [address] [address]  Dept: [phone]  Dept Fax: [phone]     PATIENT ID: Ayanna Cardona is a 80 y.o. female. HPI:  Established pt here today for an ED follow up. Patient presented to [facility] ED for evaluation of bradycardia. She had been taking Toprol 25 mg daily that was previously prescribed. She did have a hospitalization back in December of 2021 where she had some noted bradycardia. During that hospitalization, the Toprol was discontinued and was instructed not to take it. Per her caregiver, Gabino Bah, they were unaware of the change so they had been giving it to her. She has since stopped the medication and her HR has been fine. Pt denies any fever or chills. Pt today denies any HA, chest pain, or SOB. Pt denies any N/V/D/C or abdominal pain. Today, patient is doing well on current tx and voices no concerns. My previous office notes, labs and diagnostic studies were reviewed prior to and during encounter. The patient's past medical, surgical, social, and family history as well as current medications and allergies were reviewed as documented in today's encounter by LUCI Green. Current Outpatient Medications on File Prior to Visit   Medication Sig Dispense Refill    ciprofloxacin (CIPRO) 500 MG tablet Take 1 tablet by mouth 2 times daily for 7 days 14 tablet 0    donepezil (ARICEPT) 5 MG tablet TAKE 1 TABLET BY MOUTH EVERY DAY AT NIGHT      memantine (NAMENDA) 5 MG tablet Take 1 tablet by mouth 2 times daily 180 tablet 1    losartan (COZAAR) 50 MG tablet Take 1 tablet by mouth daily 30 tablet 3    POTASSIUM CHLORIDE ER PO Take 10 mEq by mouth daily      Cholecalciferol (VITAMIN D) 50 MCG (2000 UT) CAPS capsule Take 1 capsule by mouth daily       No current facility-administered medications on file prior to visit.        SUBJECTIVE:   Review of Systems   Constitutional: Negative for activity change, fatigue and fever. HENT: Negative for congestion, ear pain, rhinorrhea and sore throat. Respiratory: Negative for cough, chest tightness and shortness of breath. Cardiovascular: Negative for chest pain and palpitations. Gastrointestinal: Negative for abdominal distention, abdominal pain, constipation, diarrhea, nausea and vomiting. Endocrine: Negative for polydipsia, polyphagia and polyuria. Genitourinary: Negative for difficulty urinating and dysuria. Musculoskeletal: Negative for arthralgias, back pain and myalgias. Skin: Negative for rash. Neurological: Negative for dizziness, weakness, light-headedness and headaches. Hematological: Negative for adenopathy. Psychiatric/Behavioral: Positive for confusion (chronic; stable). Negative for agitation and behavioral problems. The patient is not nervous/anxious. OBJECTIVE: BP (!) 96/54   Pulse 55   Temp 98.3 °F (36.8 °C)   Wt 128 lb (58.1 kg)   SpO2 98%   BMI 27.70 kg/m²      Physical Exam  Vitals and nursing note reviewed. Constitutional:       General: She is not in acute distress. Appearance: Normal appearance. She is well-developed. HENT:      Head: Normocephalic and atraumatic. Cardiovascular:      Rate and Rhythm: Normal rate and regular rhythm. Heart sounds: Normal heart sounds. No murmur heard. Comments: HR 55  Pulmonary:      Effort: Pulmonary effort is normal. No respiratory distress. Breath sounds: Normal breath sounds. Chest:      Chest wall: No tenderness. Abdominal:      General: Bowel sounds are normal.      Palpations: Abdomen is soft. Tenderness: There is no abdominal tenderness. Musculoskeletal:         General: Normal range of motion. Cervical back: Normal range of motion. Right lower leg: No edema. Left lower leg: No edema. Skin:     General: Skin is warm and dry. Findings: No rash.    Neurological: Mental Status: She is alert and oriented to person, place, and time. Psychiatric:         Attention and Perception: She is inattentive (chronic). Mood and Affect: Mood normal. Affect is labile (chronic) and inappropriate (chronic). Behavior: Behavior is cooperative. Cognition and Memory: Cognition is impaired (chronic). Memory is impaired (chronic). She exhibits impaired recent memory (chronic) and impaired remote memory (chronic). ASSESSMENT:   Diagnosis Orders   1. Bradycardia     2. Essential hypertension  CBC    Comprehensive Metabolic Panel    Lipid Panel   3. Hyperglycemia  Hemoglobin A1C   4. BHAVESH (acute kidney injury) ([facility]       PLAN:  1. Bradycardia  - Was taking Toprol that had been previously discontinued  - Has stopped it. HR in office today was 55  - Will have her continue to hold the medication     2. Essential hypertension  - BP noted in office today was 96/54  - Asymptomatic  - Given low blood pressure, will discontinue Amlodipine. Will have her caregiver, Yeni Coto call in a couple of weeks with some updated blood pressures. If remains on the lower end, will cut Losartan back to 25 mg daily  - Will continue to monitor    3. BHAVESH (acute kidney injury) ([facility]  - Labs noted from [facility] hospitalization  - Cr at that time was 1.11 with GFR of 47  - Will have her stop the Bumex, as she was prescribed this medication years ago by her previous provider  - Caregiver denies any swelling or difficulty breathing  - Push fluids  - Will order updated labs     - On this date May 6, 2022,  I have spent greater than 50% of this visit reviewing previous notes, test results and/or face to face with the patient discussing the diagnoses, importance of compliance with the treatment plan, counseling, coordinating care as well as documenting on the day of the visit.      Jennifer Brady, CHARISSE-CNP

## 2022-05-06 ENCOUNTER — OFFICE VISIT (OUTPATIENT)
Dept: FAMILY MEDICINE CLINIC | Age: 82
End: 2022-05-06
Payer: MEDICARE

## 2022-05-06 VITALS
WEIGHT: 128 LBS | SYSTOLIC BLOOD PRESSURE: 96 MMHG | HEART RATE: 55 BPM | OXYGEN SATURATION: 98 % | BODY MASS INDEX: 27.7 KG/M2 | TEMPERATURE: 98.3 F | DIASTOLIC BLOOD PRESSURE: 54 MMHG

## 2022-05-06 DIAGNOSIS — R00.1 BRADYCARDIA: Primary | ICD-10-CM

## 2022-05-06 DIAGNOSIS — R73.9 HYPERGLYCEMIA: ICD-10-CM

## 2022-05-06 DIAGNOSIS — I10 ESSENTIAL HYPERTENSION: ICD-10-CM

## 2022-05-06 DIAGNOSIS — N17.9 AKI (ACUTE KIDNEY INJURY) (HCC): ICD-10-CM

## 2022-05-06 PROCEDURE — 99213 OFFICE O/P EST LOW 20 MIN: CPT | Performed by: NURSE PRACTITIONER

## 2022-06-10 RX ORDER — LOSARTAN POTASSIUM 50 MG/1
TABLET ORAL
Qty: 30 TABLET | Refills: 0 | OUTPATIENT
Start: 2022-06-10

## 2022-06-12 DIAGNOSIS — I10 ESSENTIAL HYPERTENSION: ICD-10-CM

## 2022-06-13 RX ORDER — BUMETANIDE 0.5 MG/1
TABLET ORAL
Qty: 90 TABLET | Refills: 0 | OUTPATIENT
Start: 2022-06-13

## 2022-08-09 ENCOUNTER — TELEPHONE (OUTPATIENT)
Dept: FAMILY MEDICINE CLINIC | Age: 82
End: 2022-08-09

## 2022-08-09 DIAGNOSIS — E16.2 HYPOGLYCEMIA: Primary | ICD-10-CM

## 2022-08-09 RX ORDER — LANCETS 30 GAUGE
1 EACH MISCELLANEOUS DAILY
Qty: 100 EACH | Refills: 3 | Status: SHIPPED | OUTPATIENT
Start: 2022-08-09

## 2022-08-09 RX ORDER — GLUCOSAMINE HCL/CHONDROITIN SU 500-400 MG
CAPSULE ORAL
Qty: 50 STRIP | Refills: 11 | Status: SHIPPED | OUTPATIENT
Start: 2022-08-09

## 2022-08-09 NOTE — TELEPHONE ENCOUNTER
Patients Nurse Katie Acevedo called in with some concerns of the patients abnormal behavior the past 2 days. She sates that she is not acting her normal self and her face will turn really red then the patient will get really clammy and sweaty. Katie Acevedo states that she takes [facility] vitals when this happens and all of her vitals come back normal, so she is not sure what she should do for the patient.

## 2022-08-09 NOTE — TELEPHONE ENCOUNTER
I would make sure she is getting enough fluids. Has she checked her blood sugar?  If not, lets check it when she has these episodes

## 2022-09-06 NOTE — PROGRESS NOTES
CHARISSE Barroso-CNP  [facility]  [address] [address]  [address] [address]  Dept: [phone]  Dept Fax: [phone]     Patient's guardian, Marly Kinsey, notified of the addendum that was made to the CT scan completed on 12/2021. This addendum did warrant further testing, which I will order. Per patient's guardian, it is okay to move forward with the recommended testing (MRI & Bone Scan). Permission was also granted to discuss with patient's caregiver, Machelle Duran. Alina Barnes was offered the number to [facility] to discuss further, but declines at this time. This conversation was witness by LUCI Quinones.      CHARISSE Barroso-CNP

## 2022-09-08 ENCOUNTER — TELEMEDICINE (OUTPATIENT)
Dept: FAMILY MEDICINE CLINIC | Age: 82
End: 2022-09-08
Payer: MEDICARE

## 2022-09-08 DIAGNOSIS — E78.5 HYPERLIPIDEMIA, UNSPECIFIED HYPERLIPIDEMIA TYPE: ICD-10-CM

## 2022-09-08 DIAGNOSIS — F03.91 DEMENTIA WITH BEHAVIORAL DISTURBANCE, UNSPECIFIED DEMENTIA TYPE: ICD-10-CM

## 2022-09-08 DIAGNOSIS — F41.9 ANXIETY: ICD-10-CM

## 2022-09-08 DIAGNOSIS — M89.9 LYTIC LESION OF BONE ON X-RAY: ICD-10-CM

## 2022-09-08 DIAGNOSIS — F32.A DEPRESSION, UNSPECIFIED DEPRESSION TYPE: ICD-10-CM

## 2022-09-08 DIAGNOSIS — Z85.3 HISTORY OF BREAST CANCER: ICD-10-CM

## 2022-09-08 DIAGNOSIS — I10 ESSENTIAL HYPERTENSION: Primary | ICD-10-CM

## 2022-09-08 DIAGNOSIS — Z00.00 MEDICARE ANNUAL WELLNESS VISIT, SUBSEQUENT: ICD-10-CM

## 2022-09-08 PROCEDURE — 1123F ACP DISCUSS/DSCN MKR DOCD: CPT | Performed by: NURSE PRACTITIONER

## 2022-09-08 PROCEDURE — 99213 OFFICE O/P EST LOW 20 MIN: CPT | Performed by: NURSE PRACTITIONER

## 2022-09-08 PROCEDURE — G0439 PPPS, SUBSEQ VISIT: HCPCS | Performed by: NURSE PRACTITIONER

## 2022-09-08 RX ORDER — MULTIVITAMIN WITH IRON
350 TABLET ORAL EVERY OTHER DAY
COMMUNITY

## 2022-09-08 SDOH — ECONOMIC STABILITY: FOOD INSECURITY: WITHIN THE PAST 12 MONTHS, YOU WORRIED THAT YOUR FOOD WOULD RUN OUT BEFORE YOU GOT MONEY TO BUY MORE.: NEVER TRUE

## 2022-09-08 SDOH — ECONOMIC STABILITY: FOOD INSECURITY: WITHIN THE PAST 12 MONTHS, THE FOOD YOU BOUGHT JUST DIDN'T LAST AND YOU DIDN'T HAVE MONEY TO GET MORE.: NEVER TRUE

## 2022-09-08 ASSESSMENT — PATIENT HEALTH QUESTIONNAIRE - PHQ9
9. THOUGHTS THAT YOU WOULD BE BETTER OFF DEAD, OR OF HURTING YOURSELF: 0
SUM OF ALL RESPONSES TO PHQ QUESTIONS 1-9: 7
2. FEELING DOWN, DEPRESSED OR HOPELESS: 3
SUM OF ALL RESPONSES TO PHQ QUESTIONS 1-9: 7
SUM OF ALL RESPONSES TO PHQ9 QUESTIONS 1 & 2: 3
SUM OF ALL RESPONSES TO PHQ QUESTIONS 1-9: 7
7. TROUBLE CONCENTRATING ON THINGS, SUCH AS READING THE NEWSPAPER OR WATCHING TELEVISION: 0
1. LITTLE INTEREST OR PLEASURE IN DOING THINGS: 0
5. POOR APPETITE OR OVEREATING: 0
6. FEELING BAD ABOUT YOURSELF - OR THAT YOU ARE A FAILURE OR HAVE LET YOURSELF OR YOUR FAMILY DOWN: 0
8. MOVING OR SPEAKING SO SLOWLY THAT OTHER PEOPLE COULD HAVE NOTICED. OR THE OPPOSITE, BEING SO FIGETY OR RESTLESS THAT YOU HAVE BEEN MOVING AROUND A LOT MORE THAN USUAL: 0
3. TROUBLE FALLING OR STAYING ASLEEP: 2
SUM OF ALL RESPONSES TO PHQ QUESTIONS 1-9: 7
10. IF YOU CHECKED OFF ANY PROBLEMS, HOW DIFFICULT HAVE THESE PROBLEMS MADE IT FOR YOU TO DO YOUR WORK, TAKE CARE OF THINGS AT HOME, OR GET ALONG WITH OTHER PEOPLE: 0
4. FEELING TIRED OR HAVING LITTLE ENERGY: 2

## 2022-09-08 ASSESSMENT — LIFESTYLE VARIABLES
HOW OFTEN DO YOU HAVE A DRINK CONTAINING ALCOHOL: NEVER
HOW MANY STANDARD DRINKS CONTAINING ALCOHOL DO YOU HAVE ON A TYPICAL DAY: PATIENT DOES NOT DRINK

## 2022-09-08 ASSESSMENT — ENCOUNTER SYMPTOMS
BACK PAIN: 0
DIARRHEA: 0
SORE THROAT: 0
ABDOMINAL PAIN: 0
COUGH: 0
ABDOMINAL DISTENTION: 0
RHINORRHEA: 0
SHORTNESS OF BREATH: 0
NAUSEA: 0
CHEST TIGHTNESS: 0
VOMITING: 0
CONSTIPATION: 0

## 2022-09-08 ASSESSMENT — SOCIAL DETERMINANTS OF HEALTH (SDOH): HOW HARD IS IT FOR YOU TO PAY FOR THE VERY BASICS LIKE FOOD, HOUSING, MEDICAL CARE, AND HEATING?: NOT HARD AT ALL

## 2022-09-08 NOTE — PROGRESS NOTES
Luis Lennon, APRN-CNP  [facility]  [id number]  [address] [address]  Dept: [phone]  Dept Fax: [phone]   Medicare Annual Wellness Visit    Jarrell Alvarado is here for Medicare AWV and Urinary Tract Infection (Positive urine test)    Assessment & Plan   1. Medicare annual wellness visit, subsequent  - Positive risk factors, screenings and interventions discussed  - Home safety tips provided  - Continue with annual medicare wellness visits  - Will cont with current treatment as pt is currently stable on current treatment  - Continue with all medications as previously prescribed. - Will cont to follow with all specialists as previously as instructed         Recommendations for Preventive Services Due: see orders and patient instructions/AVS.  Recommended screening schedule for the next 5-10 years is provided to the patient in written form: see Patient Instructions/AVS.     Return for Medicare Annual Wellness Visit in 1 year. Subjective       Patient's complete Health Risk Assessment and screening values have been reviewed and are found in Flowsheets. The following problems were reviewed today and where indicated follow up appointments were made and/or referrals ordered.     Positive Risk Factor Screenings with Interventions:      Depression:  PHQ-2 Score: 3  PHQ-9 Total Score: 7    Severity:1-4 = minimal depression, 5-9 = mild depression, 10-14 = moderate depression, 15-19 = moderately severe depression, 20-27 = severe depression  Depression Interventions:  Regular exercise recommended- 3-5 times per week, 30-45 minutes per session  Relaxation techniques discussed          General Health and ACP:  General  In general, how would you say your health is?: Very Good  In the past 7 days, have you experienced any of the following: New or Increased Pain, New or Increased Fatigue, Loneliness, Social Isolation, Stress or Anger?: (!) Yes  Select all that apply: (!) New or Increased Fatigue  Do you get the social and emotional support that you need?: Yes  Do you have a Living Will?: Yes    Advance Directives       Power of  Living Will ACP-Advance Directive ACP-Power of Praveena Cazares on 05/27/21 Not on File Not on File [address] 14 Risk Interventions:  Stress: regular exercise recommended- 3-5 times per week, 30-45 minutes per session, relaxation techniques discussed, patient declines any further evaluation/treatment for this issue  Anger: regular exercise recommended- 3-5 times per week, 30-45 minutes per session, relaxation techniques discussed, patient declines any further evaluation/treatment for this issue    Health Habits/Nutrition:  Physical Activity: Inactive    Days of Exercise per Week: 0 days    Minutes of Exercise per Session: 0 min     Have you lost any weight without trying in the past 3 months?: (!) Yes     Have you seen the dentist within the past year?: Yes  Health Habits/Nutrition Interventions:  Nutritional issues:  educational materials for healthy, well-balanced diet provided, educational materials to promote weight loss provided      ADLs:  In the past 7 days, did you need help from others to perform any of the following everyday activities: Eating, dressing, grooming, bathing, toileting, or walking/balance?: (!) Yes  Select all that apply: (!) Eating, Dressing, Grooming, Bathing, Toileting, Walking/Balance  In the past 7 days, did you need help from others to take care of any of the following: Laundry, housekeeping, banking/finances, shopping, telephone use, food preparation, transportation, or taking medications?: (!) Yes  Select all that apply: [employer], Housekeeping, Banking/Finances, Shopping, Telephone Use, Food Preparation, Transportation, Taking Medications  ADL Interventions:  Patient has 24 hour supervision and caregiver, Tiera Reeves, who has been taking care of her for years.  She takes her shopping, to all Dr. Dat Lee and ensures her well being. She helps with meals and medications. Objective      Patient-Reported Vitals  Patient-Reported Systolic (Top): 96 mmHg  Patient-Reported Diastolic (Bottom): 54 mmHg  Patient-Reported Pulse: 62  Patient-Reported Temperature: 98.6  Patient-Reported Weight: 109 lb  Patient-Reported Pulse Oximetry: 97            Allergies   Allergen Reactions    Aspirin Other (See Comments)    Other Other (See Comments)    Sulfa Antibiotics      Prior to Visit Medications    Medication Sig Taking? Authorizing Provider   magnesium (MAGNESIUM-OXIDE) 250 MG TABS tablet Take 350 mg by mouth daily Yes Historical Provider, MD   blood glucose monitor kit and supplies Dispense sufficient amount for indicated testing frequency plus additional to accommodate PRN testing needs. Dispense all needed supplies to include: monitor, strips, lancing device, lancets, control solutions, alcohol swabs. Please supply patient with whatever meter her insurance will cover. Dx: (E Yes Gypsy Bosworth, APRN - NP   blood glucose monitor strips Test daily & PRN times a day & as needed for symptoms of irregular blood glucose. Dispense sufficient amount for indicated testing frequency plus additional to accommodate PRN testing needs.   DX:DM (E11.8) Yes Gypsy Bosworth, APRN - NP   Lancets MISC 1 each by Does not apply route daily TESTING QD AND PRN DX: DM (E11.9) DISPENSE WHATEVER TYPE INSURANCE WILL COVER Yes Gypsy Bosworth, APRN - NP   donepezil (ARICEPT) 5 MG tablet TAKE 1 TABLET BY MOUTH EVERY DAY AT NIGHT Yes Historical Provider, MD   memantine (NAMENDA) 5 MG tablet Take 1 tablet by mouth 2 times daily Yes Gypsy Bosworth, APRN - NP   losartan (COZAAR) 50 MG tablet Take 1 tablet by mouth daily Yes Juanjo Elizabeth MD   POTASSIUM CHLORIDE ER PO Take 10 mEq by mouth daily  Patient not taking: Reported on 9/8/2022  Historical Provider, MD   Cholecalciferol (VITAMIN D) 50 MCG (2000 UT) CAPS capsule Take 1 capsule by mouth daily  Patient not taking: Reported on 9/8/2022  Historical Provider, MD [place] (Including outside providers/suppliers regularly involved in providing care):   Patient Care Team:  CHARISSE Cartagena NP as PCP - General (Nurse Practitioner)  CHARISSE Cartagena NP as PCP - [facility] Empaneled Provider  Elver Pritchard MD as Consulting Physician ([facility])     Reviewed and updated this visit:  Allergies  Meds           Greer Hurd, was evaluated through a synchronous (real-time) audio-video encounter. The patient (or guardian if applicable) is aware that this is a billable service, which includes applicable co-pays. This Virtual Visit was conducted with patient's (and/or legal guardian's) consent. The visit was conducted pursuant to the emergency declaration under the [address] [place] [address], [address] [place] [address] authority and the [employer] General Act. Patient identification was verified, and a caregiver was present when appropriate. The patient was located at Home: [address] [place] [address]. Provider was located at [facility] ([place] [address]): [address], [address]  [address] [place],  [address]      CHARISSE Barnes-CNP

## 2022-09-08 NOTE — PATIENT INSTRUCTIONS
Personalized Preventive Plan for Matthias Plata - 9/8/2022  Medicare offers a range of preventive health benefits. Some of the tests and screenings are paid in full while other may be subject to a deductible, co-insurance, and/or copay. Some of these benefits include a comprehensive review of your medical history including lifestyle, illnesses that may run in your family, and various assessments and screenings as appropriate. After reviewing your medical record and screening and assessments performed today your provider may have ordered immunizations, labs, imaging, and/or referrals for you. A list of these orders (if applicable) as well as your Preventive Care list are included within your After Visit Summary for your review. Other Preventive Recommendations:    A preventive eye exam performed by an eye specialist is recommended every 1-2 years to screen for glaucoma; cataracts, macular degeneration, and other eye disorders. A preventive dental visit is recommended every 6 months. Try to get at least 150 minutes of exercise per week or 10,000 steps per day on a pedometer . Order or download the FREE \"Exercise & Physical Activity: Your Everyday Guide\" from [employer]. Call 5-902.785.5227 or search [employer] online. You need 2288-4746 mg of calcium and 3750-7680 IU of vitamin D per day. It is possible to meet your calcium requirement with diet alone, but a vitamin D supplement is usually necessary to meet this goal.  When exposed to the sun, use a sunscreen that protects against both UVA and UVB radiation with an SPF of 30 or greater. Reapply every 2 to 3 hours or after sweating, drying off with a towel, or swimming. Always wear a seat belt when traveling in a car. Always wear a helmet when riding a bicycle or motorcycle. Heart-Healthy Diet   Sodium, Fat, and Cholesterol Controlled Diet       What Is a Heart Healthy Diet?    A heart-healthy diet is one that limits sodium , certain types of fat , and cholesterol . This type of diet is recommended for:   People with any form of cardiovascular disease (eg, coronary heart disease , peripheral vascular disease , previous heart attack , previous stroke )   People with risk factors for cardiovascular disease, such as high blood pressure , high cholesterol , or diabetes   Anyone who wants to lower their risk of developing cardiovascular disease   Sodium    Sodium is a mineral found in many foods. In general, most people consume much more sodium than they need. Diets high in sodium can increase blood pressure and lead to edema (water retention). On a heart-healthy diet, you should consume no more than 2,300 mg (milligrams) of sodium per dayabout the amount in one teaspoon of table salt. The foods highest in sodium include table salt (about 50% sodium), processed foods, convenience foods, and preserved foods. Cholesterol    Cholesterol is a fat-like, waxy substance in your blood. Our bodies make some cholesterol. It is also found in animal products, with the highest amounts in fatty meat, egg yolks, whole milk, cheese, shellfish, and organ meats. On a heart-healthy diet, you should limit your cholesterol intake to less than 200 mg per day. It is normal and important to have some cholesterol in your bloodstream. But too much cholesterol can cause plaque to build up within your arteries, which can eventually lead to a heart attack or stroke. The two types of cholesterol that are most commonly referred to are:   Low-density lipoprotein (LDL) cholesterol  Also known as bad cholesterol, this is the cholesterol that tends to build up along your arteries. Bad cholesterol levels are increased by eating fats that are saturated or hydrogenated. Optimal level of this cholesterol is less than 100. Over 130 starts to get risky for heart disease.    High-density lipoprotein (HDL) cholesterol  Also known as good cholesterol, this type of cholesterol actually carries cholesterol away from your arteries and may, therefore, help lower your risk of having a heart attack. You want this level to be high (ideally greater than 60). It is a risk to have a level less than 40. You can raise this good cholesterol by eating olive oil, canola oil, avocados, or nuts. Exercise raises this level, too. Fat    Fat is calorie dense and packs a lot of calories into a small amount of food. Even though fats should be limited due to their high calorie content, not all fats are bad. In fact, some fats are quite healthful. Fat can be broken down into four main types. The good-for-you fats are:   Monounsaturated fat  found in oils such as olive and canola, avocados, and nuts and natural nut butters; can decrease cholesterol levels, while keeping levels of HDL cholesterol high   Polyunsaturated fat  found in oils such as safflower, sunflower, soybean, corn, and sesame; can decrease total cholesterol and LDL cholesterol   Omega-3 fatty acids  particularly those found in fatty fish (such as salmon, trout, tuna, mackerel, herring, and sardines); can decrease risk of arrhythmias, decrease triglyceride levels, and slightly lower blood pressure   The fats that you want to limit are:   Saturated fat  found in animal products, many fast foods, and a few vegetables; increases total blood cholesterol, including LDL levels   Animal fats that are saturated include: butter, lard, whole-milk dairy products, meat fat, and poultry skin   Vegetable fats that are saturated include: hydrogenated shortening, palm oil, coconut oil, cocoa butter   Hydrogenated or trans fat  found in margarine and vegetable shortening, most shelf stable snack foods, and fried foods; increases LDL and decreases HDL     It is generally recommended that you limit your total fat for the day to less than 30% of your total calories.  If you follow an 1800-calorie heart healthy diet, for example, this would mean 60 (unsalted, dry-roasted), low-sodium peanut butter Dried peas, beans, and lentils   Any smoked, cured, salted, or canned meat, fish, or poultry (including curtis, chipped beef, cold cuts, hot dogs, sausages, sardines, and anchovies) Poultry skins Breaded and/or fried fish or meats Canned peas, beans, and lentils Salted nuts   Fats and Oils   Olive oil and canola oil Low-sodium, low-fat salad dressings and mayonnaise   Butter, margarine, coconut and palm oils, curtis fat   Snacks, Sweets, and Condiments   Low-sodium or unsalted versions of broths, soups, soy sauce, and condiments Pepper, herbs, and spices; vinegar, lemon, or lime juice Low-fat frozen desserts (yogurt, sherbet, fruit bars) Sugar, cocoa powder, honey, syrup, jam, and preserves Low-fat, trans-fat free cookies, cakes, and pies John and animal crackers, fig bars, nj snaps   High-fat desserts Broth, soups, gravies, and sauces, made from instant mixes or other high-sodium ingredients Salted snack foods Canned olives Meat tenderizers, seasoning salt, and most flavored vinegars   Beverages   Low-sodium carbonated beverages Tea and coffee in moderation Soy milk   Commercially softened water   Suggestions   Make whole grains, fruits, and vegetables the base of your diet. Choose heart-healthy fats such as canola, olive, and flaxseed oil, and foods high in heart-healthy fats, such as nuts, seeds, soybeans, tofu, and fish. Eat fish at least twice per week; the fish highest in omega-3 fatty acids and lowest in mercury include salmon, herring, mackerel, sardines, and canned chunk light tuna. If you eat fish less than twice per week or have high triglycerides, talk to your doctor about taking fish oil supplements. Read food labels. For products low in fat and cholesterol, look for fat free, low-fat, cholesterol free, saturated fat free, and trans fat freeAlso scan the Nutrition Facts Label, which lists saturated fat, trans fat, and cholesterol amounts. For products low in sodium, look for sodium free, very low sodium, low sodium, no added salt, and unsalted   Skip the salt when cooking or at the table; if food needs more flavor, get creative and try out different herbs and spices. Garlic and onion also add substantial flavor to foods. Trim any visible fat off meat and poultry before cooking, and drain the fat off after murphy. Use cooking methods that require little or no added fat, such as grilling, boiling, baking, poaching, broiling, roasting, steaming, stir-frying, and sauting. Avoid fast food and convenience food. They tend to be high in saturated and trans fat and have a lot of added salt. Talk to a registered dietitian for individualized diet advice. Last Reviewed: March 2011 Suraj Pastrana MS, MPH, RD   Updated: 3/29/2011       Preventing Osteoporosis: After Your Visit  Your Care Instructions  Osteoporosis means the bones are weak and thin enough that they can break easily. The older you are, the more likely you are to get osteoporosis. But with plenty of calcium, vitamin D, and exercise, you can help prevent osteoporosis. The preteen and teen years are a key time for bone building. With the help of calcium, vitamin D, and exercise in those early years and beyond, the bones reach their peak density and strength by age 27. After age 27, your bones naturally start to thin and weaken. The stronger your bones are at around age 27, the lower your risk for osteoporosis. But no matter what your age and risk are, your bones still need calcium, vitamin D, and exercise to stay strong. Also avoid smoking, and limit alcohol. Smoking and heavy alcohol use can make your bones thinner. Talk to your doctor about any special risks you might have, such as having a close relative with osteoporosis or taking a medicine that can weaken bones. Your doctor can tell you the best ways to protect your bones from thinning.   Follow-up care is a key part of your treatment and safety. Be sure to make and go to all appointments, and call your doctor if you are having problems. It's also a good idea to know your test results and keep a list of the medicines you take. How can you care for yourself at home? Get enough calcium and vitamin D. The [employer] recommends adults younger than age 46 need 1,000 mg of calcium and 600 IU of vitamin D each day. Women ages 46 to 79 need 1,200 mg of calcium and 600 IU of vitamin D each day. Men ages 46 to 79 need 1,000 mg of calcium and 600 IU of vitamin D each day. Adults 71 and older need 1,200 mg of calcium and 800 IU of vitamin D each day. Eat foods rich in calcium, like yogurt, cheese, milk, and dark green vegetables. Eat foods rich in vitamin D, like eggs, fatty fish, cereal, and fortified milk. Get some sunshine. Your body uses sunshine to make its own vitamin D. The safest time to be out in the sun is before 10 a.m. or after 3 p.m. Avoid getting sunburned. Sunburn can increase your risk of skin cancer. Talk to your doctor about taking a calcium plus vitamin D supplement. Ask about what type of calcium is right for you, and how much to take at a time. Adults ages 23 to 48 should not get more than 2,500 mg of calcium and 4,000 IU of vitamin D each day, whether it is from supplements and/or food. Adults ages 46 and older should not get more than 2,000 mg of calcium and 4,000 IU of vitamin D each day from supplements and/or food. Get regular bone-building exercise. Weight-bearing and resistance exercises keep bones healthy by working the muscles and bones against gravity. Start out at an exercise level that feels right for you. Add a little at a time until you can do the following:  Do 30 minutes of weight-bearing exercise on most days of the week. Walking, jogging, stair climbing, and dancing are good choices. Do resistance exercises with weights or elastic bands 2 to 3 days a week. Limit alcohol.  Drink no more than 1 alcohol drink a day if you are a woman. Drink no more than 2 alcohol drinks a day if you are a man. Do not smoke. Smoking can make bones thin faster. If you need help quitting, talk to your doctor about stop-smoking programs and medicines. These can increase your chances of quitting for good. When should you call for help? Watch closely for changes in your health, and be sure to contact your doctor if:  You need help with a healthy eating plan. You need help with an exercise plan    © [id number] [employer]. Care instructions adapted under license by [facility]. This care instruction is for use with your licensed healthcare professional. If you have questions about a medical condition or this instruction, always ask your healthcare professional. [address] any warranty or liability for your use of this information. Content Version: 9.4.04204; Last Revised: June 20, 2011                Keep Your Memory Ezequiel Fiore       Many factors can affect your ability to remembera hectic lifestyle, aging, stress, chronic disease, and certain medicines. But, there are steps you can take to sharpen your mind and help preserve your memory. Challenge Your Brain   Regularly challenging your mind may help keeps it in top shape. Good mental exercises include:   Crossword puzzlesUse a dictionary if you need it; you will learn more that way. Brainteasers Try some! Crafts, such as wood working and sewing   Hobbies, such as gardening and building model airplanes   SocializingVisit old friends or join groups to meet new ones.    Reading   Learning a new language   Taking a class, whether it be art history or carlos chi   TravelingExperience the food, history, and culture of your destination   Learning to use a computer   Going to museums, the theater, or thought-provoking movies   Changing things in your daily life, such as reversing your pattern in the grocery store or brushing your teeth using your nondominant hand   Use Memory Aids   There is no need to remember every detail on your own. These memory aids can help:   Calendars and day planners   Electronic organizers to store all sorts of helpful informationThese devices can \"beep\" to remind you of appointments. A book of days to record birthdays, anniversaries, and other occasions that occur on the same date every year   Detailed \"to-do\" lists and strategically placed sticky notes   Quick \"study\" sessionsBefore a gathering, review who will be there so their names will be fresh in your mind. Establish routinesFor example, keep your keys, wallet, and umbrella in the same place all the time or take medicine with your 8:00 AM glass of juice   Live a Healthy Life   Many actions that will keep your body strong will do the same for your mind. For example:   Talk to Your Doctor About Herbs and Supplements    Malnutrition and vitamin deficiencies can impair your mental function. For example, vitamin B12 deficiency can cause a range of symptoms, including confusion. But, what if your nutritional needs are being met? Can herbs and supplements still offer a benefit? Researchers have investigated a range of natural remedies, such as ginkgo , ginseng , and the supplement phosphatidylserine (PS). So far, though, the evidence is inconsistent as to whether these products can improve memory or thinking. If you are interested in taking herbs and supplements, talk to your doctor first because they may interact with other medicines that you are taking. Exercise Regularly    Among the many benefits of regular exercise are increased blood flow to the brain and decreased risk of certain diseases that can interfere with memory function. One study found that even moderate exercise has a beneficial effect.  Examples of \"moderate\" exercise include:   Playing 18 holes of golf once a week, without a cart   Playing tennis twice a week   Walking one mile per day Manage Stress    It can be tough to remember what is important when your mind is cluttered. Make time for relaxation. Choose activities that calm you down, and make it routine. Manage Chronic Conditions    Side effects of high blood pressure , diabetes, and heart disease can interfere with mental function. Many of the lifestyle steps discussed here can help manage these conditions. Strive to eat a healthy diet, exercise regularly, get stress under control, and follow your doctor's advice for your condition. Minimize Medications    Talk to your doctor about the medicines that you take. Some may be unnecessary. Also, healthy lifestyle habits may lower the need for certain drugs. Last Reviewed: April 2010 Candelaria Acosta MD   Updated: 4/13/2010     Keeping Home a [facility]       As we get older, changes in balance, gait, strength, vision, hearing, and cognition make even the most youthful senior more prone to accidents. Falls are one of the leading health risks for older people. This increased risk of falling is related to:   Aging process (eg, decreased muscle strength, slowed reflexes)   Higher incidence of chronic health problems (eg, arthritis, diabetes) that may limit mobility, agility or sensory awareness   Side effects of medicine (eg, dizziness, blurred vision)especially medicines like prescription pain medicines and drugs used to treat mental health conditions   Depending on the brittleness of your bones, the consequences of a fall can be serious and long lasting. Home Life   Research by the Association of Aging [facility]) shows that some home accidents among older adults can be prevented by making simple lifestyle changes and basic modifications and repairs to the home environment. Here are some lifestyle changes that experts recommend:   Have your hearing and vision checked regularly. Be sure to wear prescription glasses that are right for you.    Speak to your doctor or pharmacist about the possible side effects of your medicines. A number of medicines can cause dizziness. If you have problems with sleep, talk to your doctor. Limit your intake of alcohol. If necessary, use a cane or walker to help maintain your balance. Wear supportive, rubber-soled shoes, even at home. If you live in a region that gets wintry weather, you may want to put special cleats on your shoes to prevent you from slipping on the snow and ice. Exercise regularly to help maintain muscle tone, agility, and balance. Always hold the banister when going up or down stairs. Also, use  bars when getting in or out of the bath or shower, or using the toilet. To avoid dizziness, get up slowly from a lying down position. Sit up first, dangling your legs for a minute or two before rising to a standing position. Overall Home Safety Check   According to the Consumer [employer] Safety Commision's \"Older Consumer Home Safety Checklist,\" it is important to check for potential hazards in each room. And remember, proper lighting is an essential factor in home safety. If you cannot see clearly, you are more likely to fall. Important questions to ask yourself include:   Are lamp, electric, extension, and telephone cords placed out of the flow of traffic and maintained in good condition? Have frayed cords been replaced? Are all small rugs and runners slip resistant? If not, you can secure them to the floor with a special double-sided carpet tape. Are smoke detectors properly locatedone on every floor of your home and one outside of every sleeping area? Are they in good working order? Are batteries replaced at least once a year? Do you have a well-maintained carbon monoxide detector outside every sleeping are in your home? Does your furniture layout leave plenty of space to maneuver between and around chairs, tables, beds, and sofas? Are hallways, stairs and passages between rooms well lit?  Can you reach a lamp without getting out of bed?   Are floor surfaces well maintained? Shag rugs, high-pile carpeting, tile floors, and polished wood floors can be particularly slippery. Stairs should always have handrails and be carpeted or fitted with a non-skid tread. Is your telephone easily reachable. Is the cord safely tucked away? Room by Room   According to the Association of Aging, bathrooms and arslan are the two most potentially hazardous rooms in your home. In the Kitchen    Be sure your stove is in proper working order and always make sure burners and the oven are off before you go out or go to sleep. Keep pots on the back burners, turn handles away from the front of the stove, and keep stove clean and free of grease build-up. Kitchen ventilation systems and range exhausts should be working properly. Keep flammable objects such as towels and pot holders away from the cooking area except when in use. Make sure kitchen curtains are tied back. Move cords and appliances away from the sink and hot surfaces. If extension cords are needed, install wiring guides so they do not hang over the sink, range, or working areas. Look for coffee pots, kettles and toaster ovens with automatic shut-offs. Keep a mop handy in the kitchen so you can wipe up spills instantly. You should also have a small fire extinguisher. Arrange your kitchen with frequently used items on lower shelves to avoid the need to stand on a stepstool to reach them. Make sure countertops are well-lit to avoid injuries while cutting and preparing food. In the Bathroom    Use a non-slip mat or decals in the tub and shower, since wet, soapy tile or porcelain surfaces are extremely slippery. Make sure bathroom rugs are non-skid or tape them firmly to the floor. Bathtubs should have at least one, preferably two, grab bars, firmly attached to structural supports in the wall.  (Do not use built-in soap holders or glass shower doors as grab bars.)    Tub seats fitted with non-slip material on the legs allow you to wash sitting down. For people with limited mobility, bathtub transfer benches allow you to slide safely into the tub. Raised toilet seats and toilet safety rails are helpful for those with knee or hip problems. In the [facility]    Make sure you use a nightlight and that the area around your bed is clear of potential obstacles. Be careful with electric blankets and never go to sleep with a heating pad, which can cause serious burns even if on a low setting. Use fire-resistant mattress covers and pillows, and NEVER smoke in bed. Keep a phone next to the bed that is programmed to dial 911 at the push of a button. If you have a chronic condition, you may want to sign on with an automatic call-in service. Typically the system includes a small pendant that connects directly to an emergency medical voice-response system. You should also make arrangements to stay in contact with someonefriend, neighbor, family memberon a regular schedule. Fire Prevention   According to the [employer]. (Smoke Alarms for Every) [address] [place] [address], senior citizens are one of the two highest risk groups for death and serious injuries due to residential fires. When cooking, wear short-sleeved items, never a bulky long-sleeved robe. The [facility]'s Safety Checklist for Older Consumers emphasizes the importance of checking basements, garages, workshops and storage areas for fire hazards, such as volatile liquids, piles of old rags or clothing and overloaded circuits. Never smoke in bed or when lying down on a couch or recliner chair. Small portable electric or kerosene heaters are responsible for many home fires and should be used cautiously if at all. If you do use one, be sure to keep them away from flammable materials. In case of fire, make sure you have a pre-established emergency exit plan.     Have a professional check your fireplace and other fuel-burning appliances yearly. [employer]   Baby boomers entering the herrera years will continue to see the development of new products to help older adults live safely and independently in spite of age-related changes. Making Life More Livable  , by Margot Ignacio, lists over 1,000 products for \"living well in the mature years,\" such as bathing and mobility aids, household security devices, ergonomically designed knives and peelers, and faucet valves and knobs for temperature control. Medical supply stores and organizations are good sources of information about products that improve your quality of life and insure your safety.      Last Reviewed: November 2009 Penelope Ames MD   Updated: 3/7/2011

## 2022-09-08 NOTE — PROGRESS NOTES
Eric Lees, APRN-CNP  [address] [facility]  [id number]  [address] [address]  Dept: [phone]  Dept Fax: [phone]     PATIENT ID: Ruthann Cummins is a [de-identified] y.o. female. HPI:  Established pt, presenting via virtual visit for f/u on chronic medical problems; HTN, HLD, anxiety, depression, dementia, go over labs and/or diagnostic studies, and medication refills. Pt denies any fever or chills. Pt today denies any HA, chest pain, or SOB. Pt denies any N/V/D/C or abdominal pain. Today, patient is doing okay but there is still some concern with her memory. She has started to become agitated at times. She is worried about eating because she doesn't want to gain weight. Ninfa Kuo, ensures patient that she needs to eat a well balanced diet and not worry about the weight gain. My previous office notes, labs and diagnostic studies were reviewed prior to and during encounter. The patient's past medical, surgical, social, and family history as well as current medications and allergies were reviewed as documented in today's encounter by LUCI Jones. Current Outpatient Medications on File Prior to Visit   Medication Sig Dispense Refill    magnesium (MAGNESIUM-OXIDE) 250 MG TABS tablet Take 350 mg by mouth daily      blood glucose monitor kit and supplies Dispense sufficient amount for indicated testing frequency plus additional to accommodate PRN testing needs. Dispense all needed supplies to include: monitor, strips, lancing device, lancets, control solutions, alcohol swabs. Please supply patient with whatever meter her insurance will cover. Dx: (E 1 kit 0    blood glucose monitor strips Test daily & PRN times a day & as needed for symptoms of irregular blood glucose. Dispense sufficient amount for indicated testing frequency plus additional to accommodate PRN testing needs.   DX:DM (E11.8) 50 strip 11    Lancets MISC 1 each by Does not in acute distress. Appearance: Normal appearance. She is not ill-appearing or toxic-appearing. Pulmonary:      Effort: No tachypnea or accessory muscle usage. Comments: Patient able to talk in full sentences without difficulty   Neurological:      General: No focal deficit present. Mental Status: She is alert and oriented to person, place, and time. Psychiatric:         Mood and Affect: Mood normal. Affect is labile and flat. Speech: Speech normal.         Behavior: Behavior normal. Behavior is cooperative. Cognition and Memory: Cognition is impaired. Memory is impaired. She exhibits impaired recent memory and impaired remote memory. ASSESSMENT:   Diagnosis Orders   1. Essential hypertension        2. Hyperlipidemia, unspecified hyperlipidemia type        3. Anxiety        4. Depression, unspecified depression type        5. Dementia with behavioral disturbance, unspecified dementia type ([address]        6. Lytic lesion of bone on x-ray  MRI BRAIN W CONTRAST    NM BONE SCAN 3 PHASE      7. Medicare annual wellness visit, subsequent        8. History of breast cancer  MRI BRAIN W CONTRAST    NM BONE SCAN 3 PHASE        PLAN:  1. Essential hypertension  - Stable: Medication re-filled as needed, con't medications as prescribed, con't current tx plan  - Continue Losartan 50 mg daily as previously prescribed. - Continue with Amlodipine 5 mg daily as previously prescribed. 2. Hyperlipidemia, unspecified hyperlipidemia type  - Stable: Medication re-filled as needed, con't medications as prescribed, con't current tx plan  - Will cont with low fat/chol diet and exercise as ordered. 3. Anxiety  4. Depression, unspecified depression type  5.  Dementia with behavioral disturbance, unspecified dementia type ([address]  - Stable: Medication re-filled as needed, con't medications as prescribed, con't current tx plan  - CT 12/2021 with central and cortical cerebral atrophy; chronic ischemic changes. Addendum made 8/2022 - interval development of a 3 x 2 cm lytic lesion in the left frontal bone of the skull compatible with osseous metastasis. Recommendation was made for MRI with contrast of the brain and a bone scan. - Continue with Namenda 5 mg BID as previously prescribed. - Continue with Aricept 5 mg daily as previously prescribed. 6. Lytic lesion of bone on x-ray  7. History of breast cancer  - CT 12/2021 with central and cortical cerebral atrophy; chronic ischemic changes. Addendum made 8/2022 - interval development of a 3 x 2 cm lytic lesion in the left frontal bone of the skull compatible with osseous metastasis. Recommendation was made for MRI with contrast of the brain and a bone scan. - Discussion had with patient's caregiver (after the verbal okay from patient's guardian, Keyur Boyce) regarding placing the order for the recommended testing  - At this time, we are unsure if patient will be able to tolerate an MRI. We will attempt to get further diagnosis with a bone scan first. - - Both orders placed    - Rest of systems unchanged, continue current treatments. - On this date September 8, 2022,  I have spent greater than 50% of this visit reviewing previous notes, test results and/or face to face with the patient discussing the diagnoses, importance of compliance with the treatment plan, counseling, coordinating care as well as documenting on the day of the visitLilibeth Christie, was evaluated through a synchronous (real-time) audio-video encounter. The patient (or guardian if applicable) is aware that this is a billable service, which includes applicable co-pays. This Virtual Visit was conducted with patient's (and/or legal guardian's) consent. The visit was conducted pursuant to the emergency declaration under the [facility], [address] [place] [address] authority and the [employer] and [employer] General Act. Patient identification was verified, and a caregiver was present when appropriate. The patient was located at home in a state where the provider was licensed to provide care. Total time spent for this encounter: Not billed by time    --Sherene Gottron, APRN - NP on 9/8/2022 at 3:14 PM    An electronic signature was used to authenticate this note.        CHARISSE Ulrich-CNP

## 2022-09-09 ENCOUNTER — PATIENT MESSAGE (OUTPATIENT)
Dept: FAMILY MEDICINE CLINIC | Age: 82
End: 2022-09-09

## 2022-09-19 NOTE — TELEPHONE ENCOUNTER
Bridgette/ caregiver called back to follow up on this and call was transferred to [address] to discuss the bone scan and if there is malignancy the treatment plan will not change. Caregiver and  to decide if testing is to be completed. CHIEF COMPLAINT:    Chief Complaint   Patient presents with   • Nausea   • Loss of Consciousness     seizure post alcohol binge     SUBJECTIVE:    Rolando Tapia is a 41 year old male who presented requesting evaluation for possible seizure.Was in [facility] 2 weeks ago for 5-6 days, started drinking over the last one week. Last drank midnight and awoke around 6 am with chills, shaky, sweating, nausea/dry heaving. Went back to bed- thinks passed out.  Jaw and head hurting when awoke.  Was concerned had seizure- has happened in past.  Was not wet or incontinent of urine.  Hasn't taken meds in the last couple days. Supposed to be moving from [place] today.  Got DUI last night and was in ER.  He has not taken anything for headache or jaw pain. No oral injury noted.     ROS:  Comprehensive review of systems was reviewed and discussed with the patient, and was otherwise negative, except as noted in the history of present illness, above.     OBJECTIVE:  Social History     Tobacco Use   • Smoking status: Former Smoker   • Smokeless tobacco: Never Used   Substance Use Topics   • Alcohol use: Yes     Comment: occasional     PAST HISTORIES:   Allergies, Medications, Medical history, Surgical history, Social history and Family history were reviewed and updated.    PHYSICAL EXAM:   Visit Vitals  BP (!) 148/99   Pulse 98   Temp 97.6 °F (36.4 °C) (Tympanic)   Resp 22   Wt 99.8 kg   SpO2 98%   BMI 33.45 kg/m²     Pulse Ox Interpretation:  Within normal limits.  General:   The patient appears  their stated age. Not feeling well.  Skin:  Warm and dry without rash.    Head:  Normocephalic, atraumatic.   Neck:  Trachea is midline.  No submandibular, cervical or supraclavicular lymphadenopathy noted.   Eyes:  PERRL. Normal conjunctivae and sclerae.   ENT:  Nasal mucosa red; clear of drainage. Oral mucous membranes are moist. Throat pink.  TM's clear- no fluid posteriorly.  Cardiovascular:  Regular rate and rhythm without  murmur.  Respiratory:   Normal respiratory effort.  Clear to auscultation.  No wheezes, rales or rhonchi.  Musculoskeletal:  No deformity or lower extremity edema.   Neurologic:  Oriented x3.  No focal deficits.  Psychiatric:  Cooperative.  Appropriate mood and affect.    ASSESSMENT:   1. LOC (loss of consciousness) (CMS/HCC)    2. Nausea and vomiting, intractability of vomiting not specified, unspecified vomiting type      PLAN:   Orders Placed This Encounter   • CBC with Automated Differential   • COMPREHENSIVE METABOLIC PANEL   • Electrocardiogram 12-Lead     EKG was performed immediately upon entrance to room- Sinus tach. IVF of NS given about 500cc. CBC,CMP pending. Zofran 8mg ODT given in office. Pt's friend is here and he wants to leave. I have explained we do not have results yet and he will be leaving AMA.  He verbalizes he is aware of that.      Christofer Hill MD

## 2022-09-19 NOTE — TELEPHONE ENCOUNTER
Spoke with Corey Mock, patient's caregiver regarding concerns about diagnostic testing that has been ordered (bone scan and MRI). She relates that Dwayne Martin will have to stay still for at least 30 minutes at a time, which will be near impossible, in order to get an accurate result. We did discuss conscious sedation. I did have an extensive discussion with Corey Mock regarding treatment plan moving forward and if these testings would confirm malignancy, how would we proceed? Given the patient's age, chronic mental status changes and co-morbidities, Corey Mock does not feel that patient would be able to withstand any curative and/or palliative treatment of malignancy, therefore the decision has been made to forgo further testing. Per Corey Mock, she did discuss this with the patient's legal guardian, Laura Ganies.

## 2022-09-23 DIAGNOSIS — I10 ESSENTIAL HYPERTENSION: ICD-10-CM

## 2022-09-23 RX ORDER — BUMETANIDE 0.5 MG/1
TABLET ORAL
Qty: 90 TABLET | Refills: 0 | OUTPATIENT
Start: 2022-09-23

## 2022-10-11 ENCOUNTER — TELEMEDICINE (OUTPATIENT)
Dept: FAMILY MEDICINE CLINIC | Age: 82
End: 2022-10-11
Payer: MEDICARE

## 2022-10-11 DIAGNOSIS — N39.0 URINARY TRACT INFECTION WITHOUT HEMATURIA, SITE UNSPECIFIED: Primary | ICD-10-CM

## 2022-10-11 PROCEDURE — 99213 OFFICE O/P EST LOW 20 MIN: CPT | Performed by: NURSE PRACTITIONER

## 2022-10-11 PROCEDURE — 1123F ACP DISCUSS/DSCN MKR DOCD: CPT | Performed by: NURSE PRACTITIONER

## 2022-10-11 RX ORDER — CIPROFLOXACIN 500 MG/1
500 TABLET, FILM COATED ORAL 2 TIMES DAILY
Qty: 20 TABLET | Refills: 0 | Status: SHIPPED | OUTPATIENT
Start: 2022-10-11 | End: 2022-10-21

## 2022-10-11 ASSESSMENT — ENCOUNTER SYMPTOMS
ABDOMINAL PAIN: 0
DIARRHEA: 0
RHINORRHEA: 0
BACK PAIN: 0
ABDOMINAL DISTENTION: 0
CHEST TIGHTNESS: 0
NAUSEA: 0
VOMITING: 0
SORE THROAT: 0
SHORTNESS OF BREATH: 0
CONSTIPATION: 0
COUGH: 0

## 2022-10-11 NOTE — PROGRESS NOTES
Jai Lerma, APRN-CNP  [address] [facility]  [id number]  [address], [facility] [place] [address] [address]  Dept: [phone]  Dept Fax: [phone]     PATIENT ID: Aden Serna is a [de-identified] y.o. female. HPI:  Aden Serna is a [de-identified] y.o. female who is an established patient presenting, via virtual visit, complaining of urinary frequency, urgency and dysuria x 3 days, without flank pain, fever, chills, or abnormal vaginal discharge or bleeding. Her caregiver, [address] did by an over the counter test for UTI and it was positive. Pt today denies any HA, chest pain, or SOB. Pt denies any N/V/D/C or abdominal pain. Otherwise patient is doing well on current tx and voices no other concerns today. My previous office notes, labs and diagnostic studies were reviewed prior to and during encounter. The patient's past medical, surgical, social, and family history as well as current medications and allergies were reviewed as documented in today's encounter by LUCI Ram. Current Outpatient Medications on File Prior to Visit   Medication Sig Dispense Refill    magnesium (MAGNESIUM-OXIDE) 250 MG TABS tablet Take 350 mg by mouth every other day      blood glucose monitor kit and supplies Dispense sufficient amount for indicated testing frequency plus additional to accommodate PRN testing needs. Dispense all needed supplies to include: monitor, strips, lancing device, lancets, control solutions, alcohol swabs. Please supply patient with whatever meter her insurance will cover. Dx: (E 1 kit 0    blood glucose monitor strips Test daily & PRN times a day & as needed for symptoms of irregular blood glucose. Dispense sufficient amount for indicated testing frequency plus additional to accommodate PRN testing needs.   DX:DM (E11.8) 50 strip 11    Lancets MISC 1 each by Does not apply route daily TESTING QD AND PRN DX: DM (E11.9) DISPENSE WHATEVER TYPE INSURANCE WILL COVER 100 each 3 donepezil (ARICEPT) 5 MG tablet TAKE 1 TABLET BY MOUTH EVERY DAY AT NIGHT      memantine (NAMENDA) 5 MG tablet Take 1 tablet by mouth 2 times daily 180 tablet 1    losartan (COZAAR) 50 MG tablet Take 1 tablet by mouth daily 30 tablet 3    POTASSIUM CHLORIDE ER PO Take 10 mEq by mouth daily      Cholecalciferol (VITAMIN D) 50 MCG (2000 UT) CAPS capsule Take 1 capsule by mouth daily (Patient not taking: No sig reported)       No current facility-administered medications on file prior to visit. SUBJECTIVE:     Review of Systems   Constitutional:  Negative for activity change, fatigue and fever. HENT:  Negative for congestion, ear pain, rhinorrhea and sore throat. Respiratory:  Negative for cough, chest tightness and shortness of breath. Cardiovascular:  Negative for chest pain and palpitations. Gastrointestinal:  Negative for abdominal distention, abdominal pain, constipation, diarrhea, nausea and vomiting. Endocrine: Negative for polydipsia, polyphagia and polyuria. Genitourinary:  Positive for dysuria, frequency and urgency. Negative for difficulty urinating and hematuria. Musculoskeletal:  Negative for arthralgias, back pain and myalgias. Skin:  Negative for rash. Neurological:  Negative for dizziness, weakness, light-headedness and headaches. Hematological:  Negative for adenopathy. Psychiatric/Behavioral:  Negative for agitation and behavioral problems. The patient is not nervous/anxious. OBJECTIVE: There were no vitals taken for this visit, as this is a virtual encounter. Physical Exam  Vitals reviewed: Vital signs unavailable, as this is a virtual visit. Constitutional:       General: She is not in acute distress. Appearance: Normal appearance. She is not ill-appearing or toxic-appearing. Pulmonary:      Effort: No tachypnea or accessory muscle usage.       Comments: Patient able to talk in full sentences without difficulty   Neurological:      General: No focal deficit present. Mental Status: She is alert and oriented to person, place, and time. Psychiatric:         Mood and Affect: Mood normal.         Speech: Speech normal.         Behavior: Behavior normal. Behavior is cooperative. ASSESSMENT:   Diagnosis Orders   1. Urinary tract infection without hematuria, site unspecified           PLAN:  1. Urinary tract infection without hematuria, site unspecified  - Appears well, in no apparent distress.    - Denies fever, chills, flank pain or hematuria  - Increase water/cranberry juice intake,   - Monitor self closely for fever, chills  - Notify the office if s/s have not resolved within 7 days  - Avoid baths, proper wiping discussed, white cotton panties encouraged, do not hold urine at any time   - May use Pyridium OTC PRN.   - Call or return to clinic prn if these symptoms worsen or fail to improve as anticipated. - On this date October 11, 2022,  I have spent greater than 50% of this visit reviewing previous notes, test results and face to face with the patient discussing the diagnoses, importance of compliance with the treatment plan, counseling, coordinating care as well as documenting on the day of the visitLilibeth Storey, was evaluated through a synchronous (real-time) audio-video encounter. The patient (or guardian if applicable) is aware that this is a billable service, which includes applicable co-pays. This Virtual Visit was conducted with patient's (and/or legal guardian's) consent. The visit was conducted pursuant to the emergency declaration under the [facility], [facility] authority and the [employer] General Act. Patient identification was verified, and a caregiver was present when appropriate. The patient was located at Home: [address] [place] [address].    Provider was located at [facility] ([address] [place], [place] [address]t): [facility]. [address] [facility] [place] [address] [facility],  [facility] Total time spent for this encounter: Not billed by time    --CHARISSE Olsen NP on 10/11/2022 at 4:16 PM    An electronic signature was used to authenticate this note.

## 2022-11-23 ENCOUNTER — TELEPHONE (OUTPATIENT)
Dept: FAMILY MEDICINE CLINIC | Age: 82
End: 2022-11-23

## 2022-11-23 RX ORDER — CIPROFLOXACIN 500 MG/1
500 TABLET, FILM COATED ORAL 2 TIMES DAILY
Qty: 14 TABLET | Refills: 0 | Status: SHIPPED | OUTPATIENT
Start: 2022-11-23 | End: 2022-11-30

## 2022-11-23 NOTE — TELEPHONE ENCOUNTER
Lawanda Gallegos states she did an at home UA test and thew the results away. I did inform her that an antibiotic was sent over to the pharmacy.

## 2022-11-23 NOTE — TELEPHONE ENCOUNTER
Can we get the results of the U/A.?  I will start something with the long weekend coming up and keep us posted.

## 2022-11-23 NOTE — TELEPHONE ENCOUNTER
For the past 4-5 days patient has been more lethargic with a low grade fever. [facility] Nurse, Ryder Castaneda, checked urine for UTI yesterday and it came back positive. Patient is eating and drinking and having bowel movements.      Please contact [facility] [phone]  Confirmed pharmacy  [address] [facility] [address] [place] [address] [phone]   [address] [place] [address],  [address]   Phone:  [phone]  Fax:  [phone]

## 2022-11-29 DIAGNOSIS — I10 ESSENTIAL HYPERTENSION: ICD-10-CM

## 2022-11-29 RX ORDER — BUMETANIDE 0.5 MG/1
TABLET ORAL
Qty: 90 TABLET | Refills: 1 | Status: SHIPPED | OUTPATIENT
Start: 2022-11-29

## 2022-11-29 RX ORDER — MEMANTINE HYDROCHLORIDE 5 MG/1
TABLET ORAL
Qty: 180 TABLET | Refills: 1 | Status: SHIPPED | OUTPATIENT
Start: 2022-11-29

## 2022-11-29 RX ORDER — DONEPEZIL HYDROCHLORIDE 5 MG/1
TABLET, FILM COATED ORAL
Qty: 90 TABLET | Refills: 1 | Status: SHIPPED | OUTPATIENT
Start: 2022-11-29

## 2023-04-14 PROBLEM — M89.9 LYTIC LESION OF BONE ON X-RAY: Status: ACTIVE | Noted: 2023-04-14

## 2023-04-14 PROBLEM — F03.C11 SEVERE DEMENTIA WITH AGITATION (HCC): Status: ACTIVE | Noted: 2023-04-14

## 2023-04-24 ENCOUNTER — TELEMEDICINE (OUTPATIENT)
Dept: FAMILY MEDICINE CLINIC | Age: 83
End: 2023-04-24
Payer: MEDICARE

## 2023-04-24 ENCOUNTER — TELEPHONE (OUTPATIENT)
Dept: FAMILY MEDICINE CLINIC | Age: 83
End: 2023-04-24

## 2023-04-24 DIAGNOSIS — F03.C11 SEVERE DEMENTIA WITH AGITATION, UNSPECIFIED DEMENTIA TYPE (HCC): Primary | ICD-10-CM

## 2023-04-24 PROCEDURE — 99213 OFFICE O/P EST LOW 20 MIN: CPT | Performed by: NURSE PRACTITIONER

## 2023-04-24 PROCEDURE — 1123F ACP DISCUSS/DSCN MKR DOCD: CPT | Performed by: NURSE PRACTITIONER

## 2023-04-24 RX ORDER — RISPERIDONE 0.25 MG/1
0.25 TABLET ORAL 2 TIMES DAILY
Qty: 180 TABLET | Refills: 0 | Status: SHIPPED | OUTPATIENT
Start: 2023-04-24 | End: 2023-07-23

## 2023-04-24 ASSESSMENT — PATIENT HEALTH QUESTIONNAIRE - PHQ9: DEPRESSION UNABLE TO ASSESS: FUNCTIONAL CAPACITY MOTIVATION LIMITS ACCURACY

## 2023-04-24 ASSESSMENT — ENCOUNTER SYMPTOMS
RHINORRHEA: 0
SHORTNESS OF BREATH: 0
CHEST TIGHTNESS: 0
SORE THROAT: 0
ABDOMINAL DISTENTION: 0
ABDOMINAL PAIN: 0
NAUSEA: 0
DIARRHEA: 0
CONSTIPATION: 0
COUGH: 0
BACK PAIN: 0
VOMITING: 0

## 2023-04-24 NOTE — TELEPHONE ENCOUNTER
Dang from [address] called requesting further information about the patient. Needs full SSN, primary contact information, and whether this is for hospice or palliative care. I did speak with PCP who said it is for palliative care and faxed over the demographics with SSN added and contact information to her.

## 2023-04-24 NOTE — PROGRESS NOTES
Ariel Browning, APRN-CNP  [address] [facility]  [id number]  [address] [address]  Dept: [phone]  Dept Fax: [phone]     PATIENT ID: Macy Valdez is a [de-identified] y.o. female. HPI:  Established patient presenting via virtual visit today for an acute visit secondary to worsening aggression and agitation. She recently started Zoloft about 10 days ago. The last 3-4 days she has been yelling, screaming, hitting and scratching her care givers. Pt denies any fever or chills. Pt today denies any HA, chest pain, or SOB. Pt denies any N/V/D/C or abdominal pain. Otherwise pt doing well on current tx and voices no other concerns. My previous office notes, labs and diagnostic studies were reviewed prior to and during encounter. The patient's past medical, surgical, social, and family history as well as her current medications and allergies were reviewed as documented in today's encounter by LUCI Diaz. Current Outpatient Medications on File Prior to Visit   Medication Sig Dispense Refill    bumetanide (BUMEX) 0.5 MG tablet TAKE 1 TABLET BY MOUTH EVERY DAY 90 tablet 1    blood glucose monitor kit and supplies Dispense sufficient amount for indicated testing frequency plus additional to accommodate PRN testing needs. Dispense all needed supplies to include: monitor, strips, lancing device, lancets, control solutions, alcohol swabs. Please supply patient with whatever meter her insurance will cover. Dx: (E 1 kit 0    blood glucose monitor strips Test daily & PRN times a day & as needed for symptoms of irregular blood glucose. Dispense sufficient amount for indicated testing frequency plus additional to accommodate PRN testing needs.   DX:DM (E11.8) 50 strip 11    Lancets MISC 1 each by Does not apply route daily TESTING QD AND PRN DX: DM (E11.9) DISPENSE WHATEVER TYPE INSURANCE WILL COVER 100 each 3    donepezil (ARICEPT) 5 MG tablet TAKE 1 TABLET

## 2023-04-25 ENCOUNTER — TELEPHONE (OUTPATIENT)
Dept: FAMILY MEDICINE CLINIC | Age: 83
End: 2023-04-25

## 2023-04-25 DIAGNOSIS — Z85.3 HISTORY OF BREAST CANCER: ICD-10-CM

## 2023-04-25 DIAGNOSIS — M89.9 LYTIC LESION OF BONE ON X-RAY: ICD-10-CM

## 2023-04-25 DIAGNOSIS — F03.C11 SEVERE DEMENTIA WITH AGITATION, UNSPECIFIED DEMENTIA TYPE (HCC): Primary | ICD-10-CM

## 2023-04-25 NOTE — TELEPHONE ENCOUNTER
[address] called asking for order for Hospice care. She received call from pt's daughter asking for consult. Representative states that daughter prefers Hospice over Palliative care but she states if she does not qualify for Hospice they can do Palliative care. Fax order to [phone].

## 2023-04-25 NOTE — TELEPHONE ENCOUNTER
Referral was placed to [facility]. They are need to know DWAYNE Wallace preference for prescribing.     Things needed:  - H&P  - Last few OV Notes    Fax Number: [phone]

## 2023-04-26 ENCOUNTER — TELEPHONE (OUTPATIENT)
Dept: FAMILY MEDICINE CLINIC | Age: 83
End: 2023-04-26

## 2023-04-26 DIAGNOSIS — Z85.3 HISTORY OF BREAST CANCER: ICD-10-CM

## 2023-04-26 DIAGNOSIS — M89.9 LYTIC LESION OF BONE ON X-RAY: ICD-10-CM

## 2023-04-26 DIAGNOSIS — F03.C11 SEVERE DEMENTIA WITH AGITATION, UNSPECIFIED DEMENTIA TYPE (HCC): Primary | ICD-10-CM

## 2023-04-26 NOTE — TELEPHONE ENCOUNTER
Amy Martínez (patients care coordinator) called stating she needs the following for hospice to treat her. Things needed faxed:  - face sheet  - insurance information   - recent H&P  - list of medications   - order for hospice for hospice to treat IN HOME. - labs / any test results   - progressive notes  - any time she has come into the office and has been weighed  - information about the \"spot on her brain\" from about 2 year ago.     On fax cover sheet write: [facility] [address]     Fax Number: [phone] (this is Hospice)

## 2023-04-26 NOTE — TELEPHONE ENCOUNTER
Chandan Bullard called stating Hospice needs a letter/Order that states hospice can \"evaluate and treat\" in home with diagnosis on it    Please send to [facility]    fax [phone] with any questions

## 2023-06-09 DIAGNOSIS — I10 ESSENTIAL HYPERTENSION: ICD-10-CM

## 2023-06-09 DIAGNOSIS — F03.C11 SEVERE DEMENTIA WITH AGITATION, UNSPECIFIED DEMENTIA TYPE (HCC): ICD-10-CM

## 2023-06-09 RX ORDER — RISPERIDONE 0.25 MG/1
TABLET ORAL
Qty: 180 TABLET | Refills: 1 | Status: SHIPPED | OUTPATIENT
Start: 2023-06-09

## 2023-06-09 RX ORDER — BUMETANIDE 0.5 MG/1
TABLET ORAL
Qty: 90 TABLET | Refills: 1 | Status: SHIPPED | OUTPATIENT
Start: 2023-06-09

## 2023-06-21 ENCOUNTER — TELEPHONE (OUTPATIENT)
Dept: FAMILY MEDICINE CLINIC | Age: 83
End: 2023-06-21

## 2023-06-21 RX ORDER — LEVOFLOXACIN 500 MG/1
500 TABLET, FILM COATED ORAL DAILY
Qty: 7 TABLET | Refills: 0 | Status: SHIPPED | OUTPATIENT
Start: 2023-06-21 | End: 2023-06-28

## 2023-06-21 NOTE — TELEPHONE ENCOUNTER
Patient's caregiver called stating that the patient has had a low grade fever 99.9 all day and has vomited multiple times in the past 24 hours. Her voice is a harsh raspy. She has a lot of phlegm that she has been coughing up. It is very thick and yellowish. Caregiver called the hospice nurse seemed to \"brush it off\" and said have her take a Robitussen.  Ayla Courser wanted PCP to be aware